# Patient Record
Sex: MALE | Race: WHITE | NOT HISPANIC OR LATINO | Employment: OTHER | URBAN - METROPOLITAN AREA
[De-identification: names, ages, dates, MRNs, and addresses within clinical notes are randomized per-mention and may not be internally consistent; named-entity substitution may affect disease eponyms.]

---

## 2017-10-15 ENCOUNTER — HOSPITAL ENCOUNTER (EMERGENCY)
Facility: HOSPITAL | Age: 82
Discharge: HOME | End: 2017-10-16
Attending: EMERGENCY MEDICINE
Payer: MEDICARE

## 2017-10-15 VITALS
OXYGEN SATURATION: 95 % | BODY MASS INDEX: 30.31 KG/M2 | HEART RATE: 100 BPM | SYSTOLIC BLOOD PRESSURE: 123 MMHG | HEIGHT: 68 IN | WEIGHT: 200 LBS | DIASTOLIC BLOOD PRESSURE: 70 MMHG | TEMPERATURE: 100 F | RESPIRATION RATE: 20 BRPM

## 2017-10-15 DIAGNOSIS — J10.1 INFLUENZA A: Primary | ICD-10-CM

## 2017-10-15 LAB
ANION GAP SERPL CALC-SCNC: 11 MMOL/L
BASOPHILS # BLD AUTO: ABNORMAL K/UL
BASOPHILS NFR BLD: 1 %
BUN SERPL-MCNC: 8 MG/DL
CALCIUM SERPL-MCNC: 9.4 MG/DL
CHLORIDE SERPL-SCNC: 105 MMOL/L
CO2 SERPL-SCNC: 23 MMOL/L
CREAT SERPL-MCNC: 0.9 MG/DL
DIFFERENTIAL METHOD: ABNORMAL
EOSINOPHIL # BLD AUTO: ABNORMAL K/UL
EOSINOPHIL NFR BLD: 0 %
ERYTHROCYTE [DISTWIDTH] IN BLOOD BY AUTOMATED COUNT: 13.6 %
EST. GFR  (AFRICAN AMERICAN): >60 ML/MIN/1.73 M^2
EST. GFR  (NON AFRICAN AMERICAN): >60 ML/MIN/1.73 M^2
FLUAV AG SPEC QL IA: POSITIVE
FLUBV AG SPEC QL IA: NEGATIVE
GLUCOSE SERPL-MCNC: 96 MG/DL
HCT VFR BLD AUTO: 45.2 %
HGB BLD-MCNC: 15.2 G/DL
LACTATE SERPL-SCNC: 2.5 MMOL/L
LYMPHOCYTES # BLD AUTO: ABNORMAL K/UL
LYMPHOCYTES NFR BLD: 8 %
MCH RBC QN AUTO: 28.7 PG
MCHC RBC AUTO-ENTMCNC: 33.6 G/DL
MCV RBC AUTO: 86 FL
MONOCYTES # BLD AUTO: ABNORMAL K/UL
MONOCYTES NFR BLD: 12 %
NEUTROPHILS NFR BLD: 77 %
NEUTS BAND NFR BLD MANUAL: 2 %
NRBC BLD-RTO: 0 /100 WBC
PLATELET # BLD AUTO: 203 K/UL
PLATELET BLD QL SMEAR: ABNORMAL
PMV BLD AUTO: 6.9 FL
POLYCHROMASIA BLD QL SMEAR: ABNORMAL
POTASSIUM SERPL-SCNC: 3.7 MMOL/L
RBC # BLD AUTO: 5.28 M/UL
SODIUM SERPL-SCNC: 139 MMOL/L
SPECIMEN SOURCE: ABNORMAL
WBC # BLD AUTO: 5.2 K/UL

## 2017-10-15 PROCEDURE — 83605 ASSAY OF LACTIC ACID: CPT

## 2017-10-15 PROCEDURE — 85027 COMPLETE CBC AUTOMATED: CPT

## 2017-10-15 PROCEDURE — 99900026 HC AIRWAY MAINTENANCE (STAT)

## 2017-10-15 PROCEDURE — 99284 EMERGENCY DEPT VISIT MOD MDM: CPT | Mod: 25

## 2017-10-15 PROCEDURE — 85007 BL SMEAR W/DIFF WBC COUNT: CPT

## 2017-10-15 PROCEDURE — 36415 COLL VENOUS BLD VENIPUNCTURE: CPT

## 2017-10-15 PROCEDURE — 25000003 PHARM REV CODE 250: Performed by: EMERGENCY MEDICINE

## 2017-10-15 PROCEDURE — 87400 INFLUENZA A/B EACH AG IA: CPT

## 2017-10-15 PROCEDURE — 80048 BASIC METABOLIC PNL TOTAL CA: CPT

## 2017-10-15 RX ORDER — OSELTAMIVIR PHOSPHATE 75 MG/1
75 CAPSULE ORAL 2 TIMES DAILY
Qty: 10 CAPSULE | Refills: 0 | Status: SHIPPED | OUTPATIENT
Start: 2017-10-15 | End: 2017-10-20

## 2017-10-15 RX ORDER — ACETAMINOPHEN 325 MG/1
650 TABLET ORAL
Status: COMPLETED | OUTPATIENT
Start: 2017-10-15 | End: 2017-10-15

## 2017-10-15 RX ADMIN — SODIUM CHLORIDE 1000 ML: 0.9 INJECTION, SOLUTION INTRAVENOUS at 10:10

## 2017-10-15 RX ADMIN — ACETAMINOPHEN 650 MG: 325 TABLET ORAL at 10:10

## 2017-10-16 NOTE — ED PROVIDER NOTES
Encounter Date: 10/15/2017    SCRIBE #1 NOTE: Adama MAYER, am scribing for, and in the presence of, Dr. Chris .       History     Chief Complaint   Patient presents with    Cough       10/15/2017 9:24 PM     Chief complaint: Cough      Kevyn Terrell Jr. is a 86 y.o. male with a hx of Alzheimer and Dementia who presents to the ED with complaints of cough associated with rhinorrhea and congestion since this morning. Relative notes a sick contact, pt's granddaughter has Pneumonia. Pt denies emesis and fever. Pt has NKDA.         The history is provided by the patient and a relative.     Review of patient's allergies indicates:  No Known Allergies  Past Medical History:   Diagnosis Date    Alzheimer disease      History reviewed. No pertinent surgical history.  History reviewed. No pertinent family history.  Social History   Substance Use Topics    Smoking status: Never Smoker    Smokeless tobacco: Never Used    Alcohol use Yes     Review of Systems   Constitutional: Negative for fever.   HENT: Positive for congestion and rhinorrhea. Negative for sore throat.    Respiratory: Positive for cough. Negative for shortness of breath.    Cardiovascular: Negative for chest pain.   Gastrointestinal: Negative for diarrhea, nausea and vomiting.   Genitourinary: Negative for dysuria.   Musculoskeletal: Negative for back pain.   Skin: Negative for rash.   Neurological: Negative for weakness.   Hematological: Does not bruise/bleed easily.       Physical Exam     Initial Vitals [10/15/17 2115]   BP Pulse Resp Temp SpO2   123/70 (!) 114 17 100.2 °F (37.9 °C) 96 %      MAP       87.67         Physical Exam    Nursing note and vitals reviewed.  Constitutional: He appears well-developed and well-nourished. He is not diaphoretic. No distress.   HENT:   Head: Normocephalic and atraumatic.   Mouth/Throat: Oropharynx is clear and moist.   Eyes: Conjunctivae are normal.   Neck: Neck supple.   Cardiovascular: Normal rate,  regular rhythm, normal heart sounds and intact distal pulses. Exam reveals no gallop and no friction rub.    No murmur heard.  Pulmonary/Chest: Breath sounds normal. He has no wheezes. He has no rhonchi. He has no rales.   Abdominal: Soft. He exhibits no distension. There is no tenderness.   Musculoskeletal: Normal range of motion.   Neurological: He is alert. No sensory deficit.   Follows commands well. Answers questions appropriately. Pt is conversing and alert.    Skin: No rash noted. No erythema.   Psychiatric: He has a normal mood and affect. His behavior is normal. Judgment and thought content normal.         ED Course   Procedures  Labs Reviewed   CBC W/ AUTO DIFFERENTIAL - Abnormal; Notable for the following:        Result Value    MPV 6.9 (*)     Gran% 77.0 (*)     Lymph% 8.0 (*)     All other components within normal limits   LACTIC ACID, PLASMA - Abnormal; Notable for the following:     Lactate (Lactic Acid) 2.5 (*)     All other components within normal limits   INFLUENZA A AND B ANTIGEN - Abnormal; Notable for the following:     Influenza A Ag, EIA Positive (*)     All other components within normal limits   BASIC METABOLIC PANEL                        Scribe Attestation:   Scribe #1: I performed the above scribed service and the documentation accurately describes the services I performed. I attest to the accuracy of the note.    I, Dr. Haroldo Chris, personally performed the services described in this documentation. All medical record entries made by the scribe were at my direction and in my presence.  I have reviewed the chart and agree that the record reflects my personal performance and is accurate and complete. Haroldo Chris MD.  11:28 PM 10/15/2017    Kevyn Terrell Jr. is a 86 y.o. male presenting with upper respiratory symptoms in the setting of multiple kids contacts in the family and positive influenza testing here.  I suspect influenza.  There is no increased work of breathing.   Initial workup for possible pneumonia shows no focal infiltrates suggestive of pneumonia.  I do not think antibiotics are indicated.  I doubt sepsis.  He does have mildly increased lactic acid likely secondary to mild dehydration with current illness.  Patient was subsequently given 1 L normal saline section IV fluids while oral rehydration reviewed for home.  He is taking fluids by mouth well.  I do not think requires admission for further IV fluids or observation.  I do not think further blood cultures are indicated.  I have spoken extensively with family regarding return precautions and close PCP follow-up.  I did initiate oseltamivir for 5 day course given patient's age.  Detailed return precautions reviewed.        ED Course as of Oct 15 2329   Sun Oct 15, 2017   2214 CXR:  Large hiatal hernia.  No evidence of infiltrate.  (rad read)    The patient was informed of the incidental finding(s) as well as the need for PCP or specialist follow-up for reevaluation and possible further investigation or monitoring.    [MR]      ED Course User Index  [MR] Haroldo Chris MD     Clinical Impression:     1. Influenza A                               Haroldo Chris MD  10/15/17 8281

## 2017-10-16 NOTE — PLAN OF CARE
10/15/17 6816   Patient Assessment/Suction   $ Swab or suction? Left nare;Right nare;Swab;Flu   Sent to the lab? Yes

## 2019-09-25 ENCOUNTER — TELEPHONE (OUTPATIENT)
Dept: FAMILY MEDICINE | Facility: CLINIC | Age: 84
End: 2019-09-25

## 2019-09-25 NOTE — TELEPHONE ENCOUNTER
----- Message from Derrick Macedo LPN sent at 9/25/2019  1:51 PM CDT -----  Contact: Maricruz patient's granddaughter      ----- Message -----  From: Robert Kyle  Sent: 9/25/2019  11:51 AM CDT  To: Carole Rangel Staff    Patient has just been diagnosed with cancer and granddaughter needs dr. Rangel to call her back so she can know where the patient can go to get treated. He has prostate and bone cancer. [facility] but because [facility] don't accept his insurance they didn't give him any medication or anything  Maricruz's# [phone]

## 2019-09-25 NOTE — TELEPHONE ENCOUNTER
Spoke with pt  Granddaughter- Pt allison states that her grandfather has prostate cancer that has spread to his bones, spine and rib cage. Pt  Was seen at [facility] and informed him that he needed to find a dct in pt network for care because they couldn't even write him a rx . Shane bryant given the names of several urologist and the names of dct at the [facility] center, Shane bryant informed to call those dct to see if they take [employer] and to call [employer] and get the names of urologist that pt can see. Shane bryant states that she will but wanted to know if Dr. Rangel can prescribe something for pt to take now for pain until he gets an appt.

## 2019-09-25 NOTE — TELEPHONE ENCOUNTER
Don't have a problem treating the patient, but at this time I have no records of his diagnosis.  At this time, he would need an appointment with me for at least an examination as it has been 3 months and in light of his dementia. I also need his records requested, which she will likely have to sign for

## 2019-09-25 NOTE — TELEPHONE ENCOUNTER
Call pt and get more info about what exactly the granddaughter has had the pt do or needs (pt has dementia).  Message states [facility] to [facility] due to [facility] issues.  Most people take medicare, so unclear.  He will have to see Urology for the prostate cancer issue and Oncology for the prostate/bonecancer issue. The oncologist usually gives med if needed or sometimes they do other procedures, like radiation. We have several urologists(Nita Donovan, Aniket, Jose De Jesus)  in the area or she can contact her insurance. We have the cancer center next door ([facility]ita [facility], [facility], or [facility])or she can contact her insurance.

## 2019-09-26 ENCOUNTER — TELEPHONE (OUTPATIENT)
Dept: UROLOGY | Facility: CLINIC | Age: 84
End: 2019-09-26

## 2019-09-26 NOTE — TELEPHONE ENCOUNTER
Patient is scheduled to see MD Monday.   Spoke with patient's daughter.  She says she does not know if patient has seen urologist before, but could be more than 10 years ago.  He has dementia and will not remember.  Asked about provider that would be monitoring his psa labs.  She says she does not know.  Her daughter (patient's grand daughter) is who takes care of him.    Maricruz [phone]  Tried to call Maricruz.  Left message for her to call back.  Left message that we need more information about his history.  Will need information about his recent visit to ED and the name of hospital.  Called Dr. Rangel's office to see if any PSA results on file, H&P.

## 2019-09-27 NOTE — TELEPHONE ENCOUNTER
Not sure I understand 1. How he knows he has metastatic prostate cancer if hasnt seen urology in 10 years.    As well, initial call noted    Pt allison states that her grandfather has prostate cancer that has spread to his bones, spine and rib cage. Pt  Was seen at [facility] and informed him that he needed to find a dct in pt network for care because they couldn't even write him a rx     Though no rx given, did he have imaging studies or clinic visit at [facility]?? Nothing in care everywhere.    Need to find out more before patient is seen, as not making sense. Did we make inquiry to [facility]?

## 2019-09-30 DIAGNOSIS — C61 PROSTATE CANCER: Primary | ICD-10-CM

## 2020-08-30 ENCOUNTER — HOSPITAL ENCOUNTER (INPATIENT)
Facility: HOSPITAL | Age: 85
LOS: 12 days | Discharge: NURSING, REHAB OR LONG-TERM CARE | DRG: 469 | End: 2020-09-11
Attending: EMERGENCY MEDICINE | Admitting: HOSPITALIST
Payer: MEDICARE

## 2020-08-30 DIAGNOSIS — M25.551 RIGHT HIP PAIN: ICD-10-CM

## 2020-08-30 DIAGNOSIS — G30.0 EARLY ONSET ALZHEIMER'S DEMENTIA WITHOUT BEHAVIORAL DISTURBANCE: ICD-10-CM

## 2020-08-30 DIAGNOSIS — R00.8 TRIGEMINY: ICD-10-CM

## 2020-08-30 DIAGNOSIS — F02.80 EARLY ONSET ALZHEIMER'S DEMENTIA WITHOUT BEHAVIORAL DISTURBANCE: ICD-10-CM

## 2020-08-30 DIAGNOSIS — S72.001A CLOSED RIGHT HIP FRACTURE, INITIAL ENCOUNTER: Primary | ICD-10-CM

## 2020-08-30 DIAGNOSIS — F02.818 LATE ONSET ALZHEIMER'S DISEASE WITH BEHAVIORAL DISTURBANCE: ICD-10-CM

## 2020-08-30 DIAGNOSIS — R07.9 CHEST PAIN: ICD-10-CM

## 2020-08-30 DIAGNOSIS — G30.1 LATE ONSET ALZHEIMER'S DISEASE WITH BEHAVIORAL DISTURBANCE: ICD-10-CM

## 2020-08-30 LAB
ALBUMIN SERPL BCP-MCNC: 2.9 G/DL (ref 3.5–5.2)
ALP SERPL-CCNC: 603 U/L (ref 55–135)
ALT SERPL W/O P-5'-P-CCNC: 13 U/L (ref 10–44)
ANION GAP SERPL CALC-SCNC: 19 MMOL/L (ref 8–16)
AST SERPL-CCNC: 22 U/L (ref 10–40)
BASOPHILS # BLD AUTO: 0.07 K/UL (ref 0–0.2)
BASOPHILS NFR BLD: 0.7 % (ref 0–1.9)
BILIRUB SERPL-MCNC: 0.5 MG/DL (ref 0.1–1)
BUN SERPL-MCNC: 10 MG/DL (ref 8–23)
CALCIUM SERPL-MCNC: 8.4 MG/DL (ref 8.7–10.5)
CHLORIDE SERPL-SCNC: 107 MMOL/L (ref 95–110)
CO2 SERPL-SCNC: 21 MMOL/L (ref 23–29)
CREAT SERPL-MCNC: 0.7 MG/DL (ref 0.5–1.4)
DIFFERENTIAL METHOD: ABNORMAL
EOSINOPHIL # BLD AUTO: 0.4 K/UL (ref 0–0.5)
EOSINOPHIL NFR BLD: 3.7 % (ref 0–8)
ERYTHROCYTE [DISTWIDTH] IN BLOOD BY AUTOMATED COUNT: 14.6 % (ref 11.5–14.5)
EST. GFR  (AFRICAN AMERICAN): >60 ML/MIN/1.73 M^2
EST. GFR  (NON AFRICAN AMERICAN): >60 ML/MIN/1.73 M^2
GLUCOSE SERPL-MCNC: 99 MG/DL (ref 70–110)
HCT VFR BLD AUTO: 38.9 % (ref 40–54)
HGB BLD-MCNC: 12.3 G/DL (ref 14–18)
IMM GRANULOCYTES # BLD AUTO: 0.09 K/UL (ref 0–0.04)
IMM GRANULOCYTES NFR BLD AUTO: 0.9 % (ref 0–0.5)
LYMPHOCYTES # BLD AUTO: 1.2 K/UL (ref 1–4.8)
LYMPHOCYTES NFR BLD: 11.9 % (ref 18–48)
MCH RBC QN AUTO: 27.8 PG (ref 27–31)
MCHC RBC AUTO-ENTMCNC: 31.6 G/DL (ref 32–36)
MCV RBC AUTO: 88 FL (ref 82–98)
MONOCYTES # BLD AUTO: 0.8 K/UL (ref 0.3–1)
MONOCYTES NFR BLD: 7.6 % (ref 4–15)
NEUTROPHILS # BLD AUTO: 7.8 K/UL (ref 1.8–7.7)
NEUTROPHILS NFR BLD: 75.2 % (ref 38–73)
NRBC BLD-RTO: 0 /100 WBC
PLATELET # BLD AUTO: 292 K/UL (ref 150–350)
PMV BLD AUTO: 9.1 FL (ref 9.2–12.9)
POTASSIUM SERPL-SCNC: 3 MMOL/L (ref 3.5–5.1)
PROT SERPL-MCNC: 6.3 G/DL (ref 6–8.4)
RBC # BLD AUTO: 4.42 M/UL (ref 4.6–6.2)
SARS-COV-2 RDRP RESP QL NAA+PROBE: NEGATIVE
SODIUM SERPL-SCNC: 147 MMOL/L (ref 136–145)
WBC # BLD AUTO: 10.33 K/UL (ref 3.9–12.7)

## 2020-08-30 PROCEDURE — 80053 COMPREHEN METABOLIC PANEL: CPT

## 2020-08-30 PROCEDURE — 93005 ELECTROCARDIOGRAM TRACING: CPT

## 2020-08-30 PROCEDURE — 63600175 PHARM REV CODE 636 W HCPCS: Performed by: EMERGENCY MEDICINE

## 2020-08-30 PROCEDURE — 12000002 HC ACUTE/MED SURGE SEMI-PRIVATE ROOM

## 2020-08-30 PROCEDURE — U0002 COVID-19 LAB TEST NON-CDC: HCPCS

## 2020-08-30 PROCEDURE — 96375 TX/PRO/DX INJ NEW DRUG ADDON: CPT

## 2020-08-30 PROCEDURE — 86850 RBC ANTIBODY SCREEN: CPT

## 2020-08-30 PROCEDURE — 99285 EMERGENCY DEPT VISIT HI MDM: CPT | Mod: 25

## 2020-08-30 PROCEDURE — 36415 COLL VENOUS BLD VENIPUNCTURE: CPT

## 2020-08-30 PROCEDURE — 85025 COMPLETE CBC W/AUTO DIFF WBC: CPT

## 2020-08-30 PROCEDURE — 96374 THER/PROPH/DIAG INJ IV PUSH: CPT

## 2020-08-30 RX ORDER — ONDANSETRON 2 MG/ML
4 INJECTION INTRAMUSCULAR; INTRAVENOUS
Status: COMPLETED | OUTPATIENT
Start: 2020-08-30 | End: 2020-08-30

## 2020-08-30 RX ORDER — MORPHINE SULFATE 2 MG/ML
6 INJECTION, SOLUTION INTRAMUSCULAR; INTRAVENOUS
Status: COMPLETED | OUTPATIENT
Start: 2020-08-30 | End: 2020-08-30

## 2020-08-30 RX ADMIN — MORPHINE SULFATE 6 MG: 2 INJECTION, SOLUTION INTRAMUSCULAR; INTRAVENOUS at 10:08

## 2020-08-30 RX ADMIN — ONDANSETRON 4 MG: 2 INJECTION INTRAMUSCULAR; INTRAVENOUS at 10:08

## 2020-08-31 ENCOUNTER — TELEPHONE (OUTPATIENT)
Dept: ORTHOPEDICS | Facility: CLINIC | Age: 85
End: 2020-08-31

## 2020-08-31 PROBLEM — E87.0 HYPERNATREMIA: Status: ACTIVE | Noted: 2020-08-31

## 2020-08-31 PROBLEM — E83.51 HYPOCALCEMIA: Status: ACTIVE | Noted: 2020-08-31

## 2020-08-31 PROBLEM — C79.51 PROSTATE CANCER METASTATIC TO BONE: Status: ACTIVE | Noted: 2020-08-31

## 2020-08-31 PROBLEM — G30.9 ALZHEIMER'S DEMENTIA WITHOUT BEHAVIORAL DISTURBANCE: Status: ACTIVE | Noted: 2020-08-31

## 2020-08-31 PROBLEM — F02.80 ALZHEIMER'S DEMENTIA WITHOUT BEHAVIORAL DISTURBANCE: Status: ACTIVE | Noted: 2020-08-31

## 2020-08-31 PROBLEM — E87.6 HYPOKALEMIA: Status: ACTIVE | Noted: 2020-08-31

## 2020-08-31 PROBLEM — C61 PROSTATE CANCER METASTATIC TO BONE: Status: ACTIVE | Noted: 2020-08-31

## 2020-08-31 PROBLEM — S72.001A CLOSED RIGHT HIP FRACTURE, INITIAL ENCOUNTER: Status: ACTIVE | Noted: 2020-08-31

## 2020-08-31 LAB
ABO + RH BLD: NORMAL
ANION GAP SERPL CALC-SCNC: 9 MMOL/L (ref 8–16)
BASOPHILS # BLD AUTO: 0.04 K/UL (ref 0–0.2)
BASOPHILS NFR BLD: 0.5 % (ref 0–1.9)
BLD GP AB SCN CELLS X3 SERPL QL: NORMAL
BUN SERPL-MCNC: 9 MG/DL (ref 8–23)
CALCIUM SERPL-MCNC: 7.8 MG/DL (ref 8.7–10.5)
CHLORIDE SERPL-SCNC: 109 MMOL/L (ref 95–110)
CO2 SERPL-SCNC: 27 MMOL/L (ref 23–29)
CREAT SERPL-MCNC: 0.6 MG/DL (ref 0.5–1.4)
DIFFERENTIAL METHOD: ABNORMAL
EOSINOPHIL # BLD AUTO: 0 K/UL (ref 0–0.5)
EOSINOPHIL NFR BLD: 0 % (ref 0–8)
ERYTHROCYTE [DISTWIDTH] IN BLOOD BY AUTOMATED COUNT: 15 % (ref 11.5–14.5)
EST. GFR  (AFRICAN AMERICAN): >60 ML/MIN/1.73 M^2
EST. GFR  (NON AFRICAN AMERICAN): >60 ML/MIN/1.73 M^2
GLUCOSE SERPL-MCNC: 106 MG/DL (ref 70–110)
HCT VFR BLD AUTO: 34.8 % (ref 40–54)
HGB BLD-MCNC: 10.6 G/DL (ref 14–18)
IMM GRANULOCYTES # BLD AUTO: 0.04 K/UL (ref 0–0.04)
IMM GRANULOCYTES NFR BLD AUTO: 0.5 % (ref 0–0.5)
INR PPP: 1.1 (ref 0.8–1.2)
LYMPHOCYTES # BLD AUTO: 1.3 K/UL (ref 1–4.8)
LYMPHOCYTES NFR BLD: 16.9 % (ref 18–48)
MAGNESIUM SERPL-MCNC: 1.9 MG/DL (ref 1.6–2.6)
MCH RBC QN AUTO: 27.7 PG (ref 27–31)
MCHC RBC AUTO-ENTMCNC: 30.5 G/DL (ref 32–36)
MCV RBC AUTO: 91 FL (ref 82–98)
MONOCYTES # BLD AUTO: 0.6 K/UL (ref 0.3–1)
MONOCYTES NFR BLD: 7.8 % (ref 4–15)
NEUTROPHILS # BLD AUTO: 5.6 K/UL (ref 1.8–7.7)
NEUTROPHILS NFR BLD: 74.3 % (ref 38–73)
NRBC BLD-RTO: 0 /100 WBC
PHOSPHATE SERPL-MCNC: 2.1 MG/DL (ref 2.7–4.5)
PLATELET # BLD AUTO: 245 K/UL (ref 150–350)
PMV BLD AUTO: 9.7 FL (ref 9.2–12.9)
POTASSIUM SERPL-SCNC: 3.9 MMOL/L (ref 3.5–5.1)
PROTHROMBIN TIME: 12.4 SEC (ref 9–12.5)
RBC # BLD AUTO: 3.82 M/UL (ref 4.6–6.2)
SODIUM SERPL-SCNC: 145 MMOL/L (ref 136–145)
WBC # BLD AUTO: 7.59 K/UL (ref 3.9–12.7)

## 2020-08-31 PROCEDURE — 85025 COMPLETE CBC W/AUTO DIFF WBC: CPT

## 2020-08-31 PROCEDURE — 93005 ELECTROCARDIOGRAM TRACING: CPT

## 2020-08-31 PROCEDURE — 25000003 PHARM REV CODE 250: Performed by: NURSE PRACTITIONER

## 2020-08-31 PROCEDURE — 25000003 PHARM REV CODE 250: Performed by: HOSPITALIST

## 2020-08-31 PROCEDURE — 36415 COLL VENOUS BLD VENIPUNCTURE: CPT

## 2020-08-31 PROCEDURE — 84100 ASSAY OF PHOSPHORUS: CPT

## 2020-08-31 PROCEDURE — 85610 PROTHROMBIN TIME: CPT

## 2020-08-31 PROCEDURE — 83735 ASSAY OF MAGNESIUM: CPT

## 2020-08-31 PROCEDURE — 80048 BASIC METABOLIC PNL TOTAL CA: CPT

## 2020-08-31 PROCEDURE — 12000002 HC ACUTE/MED SURGE SEMI-PRIVATE ROOM

## 2020-08-31 PROCEDURE — 63600175 PHARM REV CODE 636 W HCPCS: Performed by: HOSPITALIST

## 2020-08-31 RX ORDER — POTASSIUM CHLORIDE 20 MEQ/1
40 TABLET, EXTENDED RELEASE ORAL ONCE
Status: COMPLETED | OUTPATIENT
Start: 2020-08-31 | End: 2020-08-31

## 2020-08-31 RX ORDER — SODIUM,POTASSIUM PHOSPHATES 280-250MG
2 POWDER IN PACKET (EA) ORAL
Status: DISCONTINUED | OUTPATIENT
Start: 2020-08-31 | End: 2020-09-11 | Stop reason: HOSPADM

## 2020-08-31 RX ORDER — CALCIUM GLUCONATE 20 MG/ML
1 INJECTION, SOLUTION INTRAVENOUS ONCE
Status: COMPLETED | OUTPATIENT
Start: 2020-08-31 | End: 2020-08-31

## 2020-08-31 RX ORDER — SODIUM CHLORIDE 9 MG/ML
INJECTION, SOLUTION INTRAVENOUS CONTINUOUS
Status: DISCONTINUED | OUTPATIENT
Start: 2020-08-31 | End: 2020-09-03

## 2020-08-31 RX ORDER — IBUPROFEN 200 MG
24 TABLET ORAL
Status: DISCONTINUED | OUTPATIENT
Start: 2020-08-31 | End: 2020-09-11 | Stop reason: HOSPADM

## 2020-08-31 RX ORDER — GLUCAGON 1 MG
1 KIT INJECTION
Status: DISCONTINUED | OUTPATIENT
Start: 2020-08-31 | End: 2020-09-11 | Stop reason: HOSPADM

## 2020-08-31 RX ORDER — ACETAMINOPHEN 325 MG/1
650 TABLET ORAL EVERY 4 HOURS PRN
Status: DISCONTINUED | OUTPATIENT
Start: 2020-08-31 | End: 2020-09-11 | Stop reason: HOSPADM

## 2020-08-31 RX ORDER — LANOLIN ALCOHOL/MO/W.PET/CERES
800 CREAM (GRAM) TOPICAL
Status: DISCONTINUED | OUTPATIENT
Start: 2020-08-31 | End: 2020-09-11 | Stop reason: HOSPADM

## 2020-08-31 RX ORDER — MORPHINE SULFATE 4 MG/ML
4 INJECTION, SOLUTION INTRAMUSCULAR; INTRAVENOUS EVERY 4 HOURS PRN
Status: DISCONTINUED | OUTPATIENT
Start: 2020-08-31 | End: 2020-09-11 | Stop reason: HOSPADM

## 2020-08-31 RX ORDER — POTASSIUM CHLORIDE 1.5 G/1.58G
40 POWDER, FOR SOLUTION ORAL
Status: DISCONTINUED | OUTPATIENT
Start: 2020-08-31 | End: 2020-09-11 | Stop reason: HOSPADM

## 2020-08-31 RX ORDER — TALC
6 POWDER (GRAM) TOPICAL NIGHTLY PRN
Status: DISCONTINUED | OUTPATIENT
Start: 2020-08-31 | End: 2020-09-11 | Stop reason: HOSPADM

## 2020-08-31 RX ORDER — POTASSIUM CHLORIDE 1.5 G/1.58G
60 POWDER, FOR SOLUTION ORAL
Status: DISCONTINUED | OUTPATIENT
Start: 2020-08-31 | End: 2020-09-11 | Stop reason: HOSPADM

## 2020-08-31 RX ORDER — ONDANSETRON 2 MG/ML
4 INJECTION INTRAMUSCULAR; INTRAVENOUS EVERY 6 HOURS PRN
Status: DISCONTINUED | OUTPATIENT
Start: 2020-08-31 | End: 2020-09-11 | Stop reason: HOSPADM

## 2020-08-31 RX ORDER — SODIUM CHLORIDE 0.9 % (FLUSH) 0.9 %
10 SYRINGE (ML) INJECTION
Status: DISCONTINUED | OUTPATIENT
Start: 2020-08-31 | End: 2020-09-11 | Stop reason: HOSPADM

## 2020-08-31 RX ORDER — MORPHINE SULFATE 2 MG/ML
6 INJECTION, SOLUTION INTRAMUSCULAR; INTRAVENOUS EVERY 4 HOURS PRN
Status: DISCONTINUED | OUTPATIENT
Start: 2020-08-31 | End: 2020-09-11 | Stop reason: HOSPADM

## 2020-08-31 RX ORDER — IBUPROFEN 200 MG
16 TABLET ORAL
Status: DISCONTINUED | OUTPATIENT
Start: 2020-08-31 | End: 2020-09-11 | Stop reason: HOSPADM

## 2020-08-31 RX ADMIN — SODIUM CHLORIDE: 0.9 INJECTION, SOLUTION INTRAVENOUS at 10:08

## 2020-08-31 RX ADMIN — SODIUM PHOSPHATE, MONOBASIC, MONOHYDRATE 20.01 MMOL: 276; 142 INJECTION, SOLUTION INTRAVENOUS at 08:08

## 2020-08-31 RX ADMIN — POTASSIUM CHLORIDE 40 MEQ: 1500 TABLET, FILM COATED, EXTENDED RELEASE ORAL at 01:08

## 2020-08-31 RX ADMIN — MORPHINE SULFATE 4 MG: 4 INJECTION INTRAVENOUS at 02:08

## 2020-08-31 RX ADMIN — SODIUM CHLORIDE: 0.9 INJECTION, SOLUTION INTRAVENOUS at 02:08

## 2020-08-31 RX ADMIN — CALCIUM GLUCONATE 1000 MG: 20 INJECTION, SOLUTION INTRAVENOUS at 08:08

## 2020-08-31 NOTE — CONSULTS
Skin tear to left arm with wound care orders in place. Nurse to notify wound care nurse if worsening of skin tear.

## 2020-08-31 NOTE — NURSING
Patient arrived to floor at this time via bed . Nurse at side. Vital signs stable. Oriented to room. Personal belongins at bedside. IV access patent.  Pt verbalized understanding for reason of admission and plan of care. Assumed care at this time.

## 2020-08-31 NOTE — ASSESSMENT & PLAN NOTE
As noted on xray right hip:  1. Acute angulated and displaced subcapital fracture of the proximal right femur.  2. Findings concerning for sclerotic metastatic disease involving the visualized findings sacrum possibly the iliac wings.  Consult orthopedics  PT/OT consult   for dc planning.  Pain control.  Currently requiring IV narcotics for pain control.  NPO   bedrest

## 2020-08-31 NOTE — ED NOTES
Assumed care:  Kevyn Terrell Jr. is awake, alert and oriented x 3, skin warm and dry, in NAD.  Patient states that he fell down his stairs to his trailor about 3 hours PTA.  Patient CO right hip pain.      Patient identifiers for Kevyn Terrell Jr. checked and correct.  LOC:  Kevyn Terrell Jr. is awake, alert, and aware of environment with an appropriate affect. He is oriented x 3 and speaking appropriately.  APPEARANCE:  He is resting comfortably and in no acute distress. He is clean and well groomed, patient's clothing is properly fastened.  SKIN:  The skin is warm and dry. He has normal skin turgor and moist mucus membranes. Skin is intact; no bruising or breakdown noted.  MUSCULOSKELETAL:  He is moving all extremities well, no obvious deformities noted. Pulses intact.  Right hip pain, right foot turned out  RESPIRATORY:  Airway is open and patent. Respirations are spontaneous and non-labored with normal effort and rate.  CARDIAC:  tachycardia. No peripheral edema noted. Capillary refill < 3 seconds.  ABDOMEN:  No distention noted.  Soft and non-tender upon palpation.  NEUROLOGICAL:  PERRL. Facial expression is symmetrical. Hand grasps are equal bilaterally. Normal sensation in all extremities when touched with finger.  Allergies reported:  Review of patient's allergies indicates:  No Known Allergies  OTHER NOTES:

## 2020-08-31 NOTE — SUBJECTIVE & OBJECTIVE
Past Medical History:   Diagnosis Date    Alzheimer disease     Cancer     prostate       History reviewed. No pertinent surgical history.    Review of patient's allergies indicates:  No Known Allergies    No current facility-administered medications on file prior to encounter.      No current outpatient medications on file prior to encounter.     Family History     None        Tobacco Use    Smoking status: Never Smoker    Smokeless tobacco: Never Used   Substance and Sexual Activity    Alcohol use: Yes    Drug use: No    Sexual activity: Never     Review of Systems   Unable to perform ROS: Dementia     Objective:     Vital Signs (Most Recent):  Temp: 98.2 °F (36.8 °C) (08/31/20 0142)  Pulse: 108 (08/31/20 0142)  Resp: 16 (08/31/20 0142)  BP: (!) 172/97 (08/31/20 0142)  SpO2: (!) 93 % (08/31/20 0142) Vital Signs (24h Range):  Temp:  [98.2 °F (36.8 °C)-98.8 °F (37.1 °C)] 98.2 °F (36.8 °C)  Pulse:  [] 108  Resp:  [16-22] 16  SpO2:  [93 %-100 %] 93 %  BP: (137-202)/(57-97) 172/97     Weight: 68 kg (150 lb)  Body mass index is 22.81 kg/m².    Physical Exam  Vitals signs and nursing note reviewed.   Constitutional:       General: He is not in acute distress.     Appearance: He is well-developed.   HENT:      Head: Normocephalic and atraumatic.      Mouth/Throat:      Mouth: Mucous membranes are dry.   Eyes:      Conjunctiva/sclera: Conjunctivae normal.      Pupils: Pupils are equal, round, and reactive to light.   Neck:      Musculoskeletal: Normal range of motion and neck supple.   Cardiovascular:      Rate and Rhythm: Normal rate and regular rhythm.      Heart sounds: Normal heart sounds.   Pulmonary:      Effort: Pulmonary effort is normal.      Breath sounds: Normal breath sounds.   Abdominal:      General: Bowel sounds are normal.      Palpations: Abdomen is soft.   Genitourinary:     Comments: deferred  Musculoskeletal:         General: Tenderness (right hip) and deformity (RLE externally rotated and  shortened) present.   Skin:     General: Skin is warm and dry.   Neurological:      Mental Status: He is alert.      GCS: GCS eye subscore is 4. GCS verbal subscore is 4. GCS motor subscore is 6.      Comments: Oriented to person.     Psychiatric:         Mood and Affect: Mood normal.         Behavior: Behavior normal.           CRANIAL NERVES     CN III, IV, VI   Pupils are equal, round, and reactive to light.       Significant Labs:   CBC:   Recent Labs   Lab 08/30/20  2315   WBC 10.33   HGB 12.3*   HCT 38.9*        CMP:   Recent Labs   Lab 08/30/20  2316   *   K 3.0*      CO2 21*   GLU 99   BUN 10   CREATININE 0.7   CALCIUM 8.4*   PROT 6.3   ALBUMIN 2.9*   BILITOT 0.5   ALKPHOS 603*   AST 22   ALT 13   ANIONGAP 19*   EGFRNONAA >60       Significant Imaging: xray right hip: 1. Acute angulated and displaced subcapital fracture of the proximal right femur.  2. Findings concerning for sclerotic metastatic disease involving the visualized findings sacrum possibly the iliac wings

## 2020-08-31 NOTE — ASSESSMENT & PLAN NOTE
Patient has hypokalemia which is currently uncontrolled. Last electrolytes reviewed-   Recent Labs   Lab 08/30/20  2316   K 3.0*   . Will replace potassium and monitor electrolytes closely. Continuous telemetry.

## 2020-08-31 NOTE — PLAN OF CARE
Pt alert. Oriented to person. Confused. Requires frequent reorientation. IVF infusing. NPO status maintained. Incontinent of urine. Skin tear with dressing to left forearm. Moves arms and left lower extremity well. C/O pain to RLE. Awaiting consult by surgery. Pulses palpable. Calcium and phosphorous replaced today. Pt has remained afebrile. Oxygen at 96% room air. Telemetry monitoring continued. Bed in lowest position. Side rails up x 3. Call light and needed items placed within patient reach. Pt has a bed near the nursing station. Safety maintained.

## 2020-08-31 NOTE — NURSING
Left forearm cleansed with sterile normal saline. Foam dressing applied to affected area and secured with soft non-adherent bandage wrap.

## 2020-08-31 NOTE — PLAN OF CARE
Cm unable to assess pt, pt confused at this time.  Cm called daughter Nichelle and spoke with spouse-Marko. Marko communicated with his wife Nichelle mckeon cm asked the questions.  Pt lives with his granddaughters and their children. Pt was on [facility].  Marko verbalized pt will return to home with Hospice on discharge.   Disposition:  Daughter has given  verbal consent to sign the pt's choice disclosure form, to resume [facility].  Tj spoke with Ashley at [facility] - [phone].  She informed me that the family removed hospice so pt can get further care and will possibly resume Hospice on d/c.  Cm will continue to follow-up.       08/31/20 5732   Discharge Assessment   Assessment Type Discharge Planning Assessment   Confirmed/corrected address and phone number on facesheet? Yes   Assessment information obtained from? Caregiver   Expected Length of Stay (days) 3   Communicated expected length of stay with patient/caregiver yes   Prior to hospitilization cognitive status: Alert/Oriented   Prior to hospitalization functional status: Partially Dependent;Needs Assistance   Current cognitive status: Unable to Assess   Current Functional Status: Completely Dependent   Facility Arrived From: home   Lives With child(le), adult   Able to Return to Prior Arrangements yes   Is patient able to care for self after discharge? No   Who are your caregiver(s) and their phone number(s)? daughter- Nichelle- [phone]   Patient's perception of discharge disposition hospice/home   Readmission Within the Last 30 Days no previous admission in last 30 days   Patient currently being followed by outpatient case management? Yes   If yes, name of outpatient case management following: insurance company assigned oupatient case management   Patient currently receives any other outside agency services? Yes   Name and contact number of agency or person providing outside services [facility]   Is it the  patient/care giver preference to resume care with the current outside agency? Yes   Equipment Currently Used at Home wheelchair;hospital bed;oxygen;respiratory supplies   Do you have any problems affording any of your prescribed medications? No   Is the patient taking medications as prescribed? yes   Does the patient have transportation home? Yes   Transportation Anticipated family or friend will provide   Dialysis Name and Scheduled days no   Does the patient receive services at the [facility]mad[facility] Clinic? No   Discharge Plan A Hospice/home   Discharge Plan B Hospice/home   DME Needed Upon Discharge  none   Patient/Family in Agreement with Plan yes

## 2020-08-31 NOTE — ASSESSMENT & PLAN NOTE
Patient has hypokalemia which is currently controlled. Last electrolytes reviewed-   Recent Labs   Lab 08/30/20  2316 08/31/20  0513   K 3.0* 3.9   . Will replace potassium and monitor electrolytes closely. Continuous telemetry.

## 2020-08-31 NOTE — PLAN OF CARE
Advance Care Planning   Patient's granddaughter wishes to change patient to full code at this time. orders placed

## 2020-08-31 NOTE — PROGRESS NOTES
Morning lab results revealed a Phosphorous level of 2.1 and Calcium of 7.8. Pt is NPO. Dr. Huber notified. Awaiting orders.

## 2020-08-31 NOTE — PROGRESS NOTES
[facility] Hospital Medicine  Progress Note    Patient Name: Kevyn Terrell Jr.  MRN: [id number]  Patient Class: IP- Inpatient   Admission Date: 8/30/2020  Length of Stay: 0 days  Attending Physician: Maricruz Huber MD  Primary Care Provider: Carole Rangel MD        Subjective:     Principal Problem:Closed right hip fracture, initial encounter        HPI:  Kevyn Terrell Jr. is a 89 y.o. male with a PMHx of Alzheimer's disease and metastatic cancer who presented to the ED via EMS with right hip pain following a fall earlier today.  Pt's granddaughter states that she had gone to the grocery store and when she returned, she found pt on the ground.  He had apparently fallen down 2 stairs at his trailer.  Pt is pleasantly confused and does not remember the incident.  History is obtained from the granddaughter.  Pt was recently medicated for pain and has no complaints at this time.        Overview/Hospital Course:  No notes on file    Interval History:  Patient seen and examined.  Pleasantly confused with no complaints    Review of Systems   Unable to perform ROS: Dementia     Objective:     Vital Signs (Most Recent):  Temp: 96.8 °F (36 °C) (08/31/20 1301)  Pulse: 66 (08/31/20 1301)  Resp: 18 (08/31/20 1301)  BP: (!) 121/55 (08/31/20 1301)  SpO2: (!) 92 % (08/31/20 1301) Vital Signs (24h Range):  Temp:  [96.8 °F (36 °C)-98.8 °F (37.1 °C)] 96.8 °F (36 °C)  Pulse:  [] 66  Resp:  [16-22] 18  SpO2:  [92 %-100 %] 92 %  BP: (121-202)/(55-97) 121/55     Weight: 60 kg (132 lb 5.8 oz)  Body mass index is 20.13 kg/m².  No intake or output data in the 24 hours ending 08/31/20 1303   Physical Exam  Vitals signs and nursing note reviewed.   Constitutional:       General: He is not in acute distress.     Appearance: He is well-developed.   HENT:      Head: Normocephalic and atraumatic.      Mouth/Throat:      Mouth: Mucous membranes are moist.   Eyes:      Conjunctiva/sclera: Conjunctivae normal.       Pupils: Pupils are equal, round, and reactive to light.   Neck:      Musculoskeletal: Normal range of motion and neck supple.   Cardiovascular:      Rate and Rhythm: Normal rate and regular rhythm.      Heart sounds: Normal heart sounds.   Pulmonary:      Effort: Pulmonary effort is normal.      Breath sounds: Normal breath sounds.   Abdominal:      General: Bowel sounds are normal.      Palpations: Abdomen is soft.   Musculoskeletal:         General: Tenderness (right hip) and deformity (RLE externally rotated and shortened) present.      Comments: Bandage over superficial left upper extremity abrasions   Skin:     General: Skin is warm and dry.   Neurological:      Mental Status: He is alert. He is confused.      Comments: Oriented to person.     Psychiatric:         Mood and Affect: Mood normal.         Behavior: Behavior normal.         Significant Labs: All pertinent labs within the past 24 hours have been reviewed.    Significant Imaging: I have reviewed and interpreted all pertinent imaging results/findings within the past 24 hours.      Assessment/Plan:      * Closed right hip fracture, initial encounter  Right hip x-ray reviewed  Consult orthopedics  PT/OT consult after orthopedics eval   for dc planning.  Pain control.  Currently requiring IV narcotics for pain control.  NPO   bedrest      Alzheimer's dementia without behavioral disturbance  Chronic.  Fall and delirium precautions.  1) During the day, please open the shades and turn on the lights- If patient is in private room, please make sure they are close to the window.    2) Make sure the correct date and time is written on the whiteboard, as well as the current care team  3) Minimize interruptions at night-time, close shades and turn off TV.   4) When possible, during daytime make sure patient is in chair and provide activities that engage patient.  5) Please make sure patient has hearing aids and glasses while awake.        Prostate  cancer metastatic to bone  Followed by Hematology/Oncology at [facility].      Hypokalemia  Patient has hypokalemia which is currently controlled. Last electrolytes reviewed-   Recent Labs   Lab 08/30/20  2316 08/31/20  0527   K 3.0* 3.9   . Will replace potassium and monitor electrolytes closely. Continuous telemetry.      VTE Risk Mitigation (From admission, onward)         Ordered     IP VTE HIGH RISK PATIENT  Once      08/31/20 0212     Place sequential compression device  Until discontinued      08/31/20 0212                Discharge Planning   BREANNE: 9/4/2020     Code Status: DNR   Is the patient medically ready for discharge?:     Reason for patient still in hospital (select all that apply): Consult recommendations                     Maricruz Huber MD  Department of Hospital Medicine   [facility]

## 2020-08-31 NOTE — PT/OT/SLP PROGRESS
Physical Therapy      Patient Name:  Kevyn Terrell Jr.   MRN:  [id number]    PT orders acknowledged. Pt with R subcapital fx with ortho consult pending/on bedrest. Please re consult as appropriate.    Nereida Enriquez, PT

## 2020-08-31 NOTE — ED PROVIDER NOTES
Encounter Date: 8/30/2020    SCRIBE #1 NOTE: I, Zaida eLpe, am scribing for, and in the presence of, Dr. Ng.       History     Chief Complaint   Patient presents with    Fall     Down stairs, ~ 3 hours PTA, R pelvic/ hip pain       Time seen by provider: 10:48 PM on 08/30/2020    Kevyn Terrell Jr. is a 89 y.o. male with PMHx of Alzheimer who presents to the ED via EMS with an onset of right hip pain s/p a fall down 2 steps at his trailer.  Patient doesn't remember what happened but notes constant pain.  He notes that he lives with his daughter and grand daughter.  He confirms pain to his right hip.  The patient denies head pain, neck pain or any other symptoms at this time.  No PSHx.  History is limited secondary to Alzheimer's    The history is provided by the patient and a relative.     Review of patient's allergies indicates:  No Known Allergies  Past Medical History:   Diagnosis Date    Alzheimer disease      No past surgical history on file.  No family history on file.  Social History     Tobacco Use    Smoking status: Never Smoker    Smokeless tobacco: Never Used   Substance Use Topics    Alcohol use: Yes    Drug use: No     Review of Systems   Constitutional: Negative for fever.   HENT: Negative for sore throat.    Respiratory: Negative for shortness of breath.    Cardiovascular: Negative for chest pain.   Gastrointestinal: Positive for abdominal pain. Negative for nausea.   Genitourinary: Negative for dysuria.   Musculoskeletal: Positive for arthralgias. Negative for back pain and neck pain.   Skin: Negative for rash.   Neurological: Negative for weakness.   Hematological: Does not bruise/bleed easily.       Physical Exam     Initial Vitals [08/30/20 2202]   BP Pulse Resp Temp SpO2   (!) 177/74 (!) 122 (!) 21 98.8 °F (37.1 °C) 100 %      MAP       --         Physical Exam    Nursing note and vitals reviewed.  Constitutional: He appears well-developed and well-nourished. No distress.    Non-toxic, well-appearing male.  Patient is tremulous.    HENT:   Head: Normocephalic and atraumatic.   Eyes: Conjunctivae and EOM are normal. Pupils are equal, round, and reactive to light.   Neck: Neck supple.   Cardiovascular: Normal rate, regular rhythm and normal heart sounds. Exam reveals no gallop and no friction rub.    No murmur heard.  Pulses:       Carotid pulses are 2+ on the right side and 2+ on the left side.       Radial pulses are 2+ on the right side and 2+ on the left side.        Femoral pulses are 2+ on the right side and 2+ on the left side.       Popliteal pulses are 2+ on the right side and 2+ on the left side.        Dorsalis pedis pulses are 2+ on the right side and 2+ on the left side.        Posterior tibial pulses are 2+ on the right side and 2+ on the left side.   Pulmonary/Chest: Breath sounds normal. No respiratory distress. He has no wheezes. He has no rhonchi. He has no rales.   Abdominal: Soft. Bowel sounds are normal. He exhibits no distension. There is no abdominal tenderness.   Musculoskeletal: Normal range of motion. No edema.      Right hip: He exhibits tenderness.        Right ankle: No tenderness (distal end).      Comments: Patient is positive for tenderness over the right anterior hip with shortened externally rotated RLE.    Neurological: He is alert and oriented to person, place, and time.   Skin: Skin is warm and dry.   Psychiatric: He has a normal mood and affect.         ED Course   Procedures  Labs Reviewed   CBC W/ AUTO DIFFERENTIAL - Abnormal; Notable for the following components:       Result Value    RBC 4.42 (*)     Hemoglobin 12.3 (*)     Hematocrit 38.9 (*)     Mean Corpuscular Hemoglobin Conc 31.6 (*)     RDW 14.6 (*)     MPV 9.1 (*)     Immature Granulocytes 0.9 (*)     Gran # (ANC) 7.8 (*)     Immature Grans (Abs) 0.09 (*)     Gran% 75.2 (*)     Lymph% 11.9 (*)     All other components within normal limits   COMPREHENSIVE METABOLIC PANEL   SARS-COV-2 RNA  AMPLIFICATION, QUAL   TYPE & SCREEN     EKG Readings: (Independently Interpreted)   Initial Reading: No STEMI. Rhythm: Normal Sinus Rhythm. Heart Rate: 97 bpm. Conduction: RBBB. T Waves Flipped: V1, V2 and V3.   Normal sinus rhythm at a ventricular rate of 97 bpm with frequent PVC's, RBBB sign artifact.  No ST elevation. T wave inversion on V1, V2, V3 leads.        Imaging Results          X-Ray Hip 2 View Right (In process)                  Medical Decision Making:   History:   Old Medical Records: I decided to obtain old medical records.  Independently Interpreted Test(s):   I have ordered and independently interpreted EKG Reading(s) - see prior notes  Clinical Tests:   Lab Tests: Ordered and Reviewed  Radiological Study: Ordered and Reviewed  Medical Tests: Ordered and Reviewed            Scribe Attestation:   Scribe #1: I performed the above scribed service and the documentation accurately describes the services I performed. I attest to the accuracy of the note.    I, Dr. Agustín Ng personally performed the services described in this documentation. All medical record entries made by the scribe were at my direction and in my presence.  I have reviewed the chart and agree that the record reflects my personal performance and is accurate and complete. Agustín Ng MD.  6:47 AM 08/31/2020    DISCLAIMER: This note was prepared with Dragon NaturallySpeaking voice recognition transcription software. Garbled syntax, mangled pronouns, and other bizarre constructions may be attributed to that software system         ED Course as of Aug 30 2355   Sun Aug 30, 2020   2332 Concern for hip fracture.  Will likely admit.    [JS]   2342 Patient appears to have a femoral neck fracture.  He will need to be admitted with orthopedic consult.    [JS]   2343 He has a history of Alzheimer's.    [JS]      ED Course User Index  [JS] Agustín Ng MD                Clinical Impression:       ICD-10-CM ICD-9-CM   1. Closed right  hip fracture, initial encounter  S72.001A 820.8   2. Right hip pain  M25.551 719.45   3. Late onset Alzheimer's disease with behavioral disturbance  G30.1 331.0    F02.81 294.11     R07.9 786.50         Disposition:   Disposition: Admitted                        Agustín Ng MD  08/31/20 0647       Agustín Ng MD  08/31/20 0647

## 2020-08-31 NOTE — SIGNIFICANT EVENT
Patients' grand daughter here to witness consent for surgery. She states that her grandfather lives with her and she wants his status changed so that he is resuscitated if anything happens during surgery. She no longer wishes for him to be DNR. Dr. Huber notified.

## 2020-08-31 NOTE — H&P
[facility] Hospital Medicine  History & Physical    Patient Name: Kevyn Terrell Jr.  MRN: [id number]  Admission Date: 8/30/2020  Attending Physician: Maricruz Huber MD   Primary Care Provider: Carole Rangel MD         Patient information was obtained from relative(s), past medical records and ER records.     Subjective:     Principal Problem:Closed right hip fracture, initial encounter    Chief Complaint:   Chief Complaint   Patient presents with    Fall     Down stairs, ~ 3 hours PTA, R pelvic/ hip pain        HPI: Kevyn Terrell Jr. is a 89 y.o. male with a PMHx of Alzheimer's disease and metastatic cancer who presented to the ED via EMS with right hip pain following a fall earlier today.  Pt's granddaughter states that she had gone to the grocery store and when she returned, she found pt on the ground.  He had apparently fallen down 2 stairs at his trailer.  Pt is pleasantly confused and does not remember the incident.  History is obtained from the granddaughter.  Pt was recently medicated for pain and has no complaints at this time.        Past Medical History:   Diagnosis Date    Alzheimer disease     Cancer     prostate       History reviewed. No pertinent surgical history.    Review of patient's allergies indicates:  No Known Allergies    No current facility-administered medications on file prior to encounter.      No current outpatient medications on file prior to encounter.     Family History     None        Tobacco Use    Smoking status: Never Smoker    Smokeless tobacco: Never Used   Substance and Sexual Activity    Alcohol use: Yes    Drug use: No    Sexual activity: Never     Review of Systems   Unable to perform ROS: Dementia     Objective:     Vital Signs (Most Recent):  Temp: 98.2 °F (36.8 °C) (08/31/20 0142)  Pulse: 108 (08/31/20 0142)  Resp: 16 (08/31/20 0142)  BP: (!) 172/97 (08/31/20 0142)  SpO2: (!) 93 % (08/31/20 0142) Vital Signs (24h Range):  Temp:  [98.2 °F  (36.8 °C)-98.8 °F (37.1 °C)] 98.2 °F (36.8 °C)  Pulse:  [] 108  Resp:  [16-22] 16  SpO2:  [93 %-100 %] 93 %  BP: (137-202)/(57-97) 172/97     Weight: 68 kg (150 lb)  Body mass index is 22.81 kg/m².    Physical Exam  Vitals signs and nursing note reviewed.   Constitutional:       General: He is not in acute distress.     Appearance: He is well-developed.   HENT:      Head: Normocephalic and atraumatic.      Mouth/Throat:      Mouth: Mucous membranes are dry.   Eyes:      Conjunctiva/sclera: Conjunctivae normal.      Pupils: Pupils are equal, round, and reactive to light.   Neck:      Musculoskeletal: Normal range of motion and neck supple.   Cardiovascular:      Rate and Rhythm: Normal rate and regular rhythm.      Heart sounds: Normal heart sounds.   Pulmonary:      Effort: Pulmonary effort is normal.      Breath sounds: Normal breath sounds.   Abdominal:      General: Bowel sounds are normal.      Palpations: Abdomen is soft.   Genitourinary:     Comments: deferred  Musculoskeletal:         General: Tenderness (right hip) and deformity (RLE externally rotated and shortened) present.   Skin:     General: Skin is warm and dry.   Neurological:      Mental Status: He is alert.      GCS: GCS eye subscore is 4. GCS verbal subscore is 4. GCS motor subscore is 6.      Comments: Oriented to person.     Psychiatric:         Mood and Affect: Mood normal.         Behavior: Behavior normal.           CRANIAL NERVES     CN III, IV, VI   Pupils are equal, round, and reactive to light.       Significant Labs:   CBC:   Recent Labs   Lab 08/30/20  2315   WBC 10.33   HGB 12.3*   HCT 38.9*        CMP:   Recent Labs   Lab 08/30/20  2316   *   K 3.0*      CO2 21*   GLU 99   BUN 10   CREATININE 0.7   CALCIUM 8.4*   PROT 6.3   ALBUMIN 2.9*   BILITOT 0.5   ALKPHOS 603*   AST 22   ALT 13   ANIONGAP 19*   EGFRNONAA >60       Significant Imaging: xray right hip: 1. Acute angulated and displaced subcapital fracture of  the proximal right femur.  2. Findings concerning for sclerotic metastatic disease involving the visualized findings sacrum possibly the iliac wings    Assessment/Plan:     * Closed right hip fracture, initial encounter  As noted on xray right hip:  1. Acute angulated and displaced subcapital fracture of the proximal right femur.  2. Findings concerning for sclerotic metastatic disease involving the visualized findings sacrum possibly the iliac wings.  Consult orthopedics  PT/OT consult   for dc planning.  Pain control.  Currently requiring IV narcotics for pain control.  NPO   bedrest      Alzheimer's dementia without behavioral disturbance  Chronic.  Fall and delirium precautions.  1) During the day, please open the shades and turn on the lights- If patient is in private room, please make sure they are close to the window.    2) Make sure the correct date and time is written on the whiteboard, as well as the current care team  3) Minimize interruptions at night-time, close shades and turn off TV.   4) When possible, during daytime make sure patient is in chair and provide activities that engage patient.  5) Please make sure patient has hearing aids and glasses while awake.        Prostate cancer metastatic to bone  Followed by Hematology[facility]Oncology at [facility].      Hypokalemia  Patient has hypokalemia which is currently uncontrolled. Last electrolytes reviewed-   Recent Labs   Lab 08/30/20  2316   K 3.0*   . Will replace potassium and monitor electrolytes closely. Continuous telemetry.      VTE Risk Mitigation (From admission, onward)         Ordered     IP VTE HIGH RISK PATIENT  Once      08/31/20 0212     Place sequential compression device  Until discontinued      08/31/20 0212            Advance Care Planning   Per conversation with pt's granddaughter, pt has metastatic prostate cancer and pt/family do not wish to artificially prolong pt's life.  Pt's code status to be DNR.             Millie Key, GERMANP  Department of Hospital Medicine   [facility]

## 2020-08-31 NOTE — PLAN OF CARE
Patient AAO X 4. With intermittent confusion. Patient free from injury during shift. Pain managed pharmacologically. Provided full relief. Patient free from N/V during shift. IV access has IV fluids. Patient placed on cardiac monitoring. ST. Remained afebrile during shift. Pt on room air tolerating well. Condom cath in place. Restroom offered. Repositioned as needed. Safety maintained with bed alarm. Bed in lowest position, call light and personal items within reach. Patient verbalized understanding of care. Will continue to monitor with every 2 hour rounding.

## 2020-08-31 NOTE — SUBJECTIVE & OBJECTIVE
Interval History:  Patient seen and examined.  Pleasantly confused with no complaints    Review of Systems   Unable to perform ROS: Dementia     Objective:     Vital Signs (Most Recent):  Temp: 96.8 °F (36 °C) (08/31/20 1301)  Pulse: 66 (08/31/20 1301)  Resp: 18 (08/31/20 1301)  BP: (!) 121/55 (08/31/20 1301)  SpO2: (!) 92 % (08/31/20 1301) Vital Signs (24h Range):  Temp:  [96.8 °F (36 °C)-98.8 °F (37.1 °C)] 96.8 °F (36 °C)  Pulse:  [] 66  Resp:  [16-22] 18  SpO2:  [92 %-100 %] 92 %  BP: (121-202)/(55-97) 121/55     Weight: 60 kg (132 lb 5.8 oz)  Body mass index is 20.13 kg/m².  No intake or output data in the 24 hours ending 08/31/20 1303   Physical Exam  Vitals signs and nursing note reviewed.   Constitutional:       General: He is not in acute distress.     Appearance: He is well-developed.   HENT:      Head: Normocephalic and atraumatic.      Mouth/Throat:      Mouth: Mucous membranes are moist.   Eyes:      Conjunctiva/sclera: Conjunctivae normal.      Pupils: Pupils are equal, round, and reactive to light.   Neck:      Musculoskeletal: Normal range of motion and neck supple.   Cardiovascular:      Rate and Rhythm: Normal rate and regular rhythm.      Heart sounds: Normal heart sounds.   Pulmonary:      Effort: Pulmonary effort is normal.      Breath sounds: Normal breath sounds.   Abdominal:      General: Bowel sounds are normal.      Palpations: Abdomen is soft.   Musculoskeletal:         General: Tenderness (right hip) and deformity (RLE externally rotated and shortened) present.      Comments: Bandage over superficial left upper extremity abrasions   Skin:     General: Skin is warm and dry.   Neurological:      Mental Status: He is alert. He is confused.      Comments: Oriented to person.     Psychiatric:         Mood and Affect: Mood normal.         Behavior: Behavior normal.         Significant Labs: All pertinent labs within the past 24 hours have been reviewed.    Significant Imaging: I have  reviewed and interpreted all pertinent imaging results/findings within the past 24 hours.

## 2020-08-31 NOTE — TELEPHONE ENCOUNTER
----- Message from Nae Fernandez MA sent at 8/31/2020  8:30 AM CDT -----  Contact: Annie Felder  Room  423 Right hip FX   Attending  Sully GRAMAJO   Call back [phone]

## 2020-08-31 NOTE — ASSESSMENT & PLAN NOTE
Right hip x-ray reviewed  Consult orthopedics  PT/OT consult after orthopedics eval   for dc planning.  Pain control.  Currently requiring IV narcotics for pain control.  NPO   bedrest

## 2020-08-31 NOTE — HPI
Kevyn Terrell Jr. is a 89 y.o. male with a PMHx of Alzheimer's disease and metastatic cancer who presented to the ED via EMS with right hip pain following a fall earlier today.  Pt's granddaughter states that she had gone to the grocery store and when she returned, she found pt on the ground.  He had apparently fallen down 2 stairs at his trailer.  Pt is pleasantly confused and does not remember the incident.  History is obtained from the granddaughter.  Pt was recently medicated for pain and has no complaints at this time.

## 2020-08-31 NOTE — ASSESSMENT & PLAN NOTE
Chronic.  Fall and delirium precautions.  1) During the day, please open the shades and turn on the lights- If patient is in private room, please make sure they are close to the window.    2) Make sure the correct date and time is written on the whiteboard, as well as the current care team  3) Minimize interruptions at night-time, close shades and turn off TV.   4) When possible, during daytime make sure patient is in chair and provide activities that engage patient.  5) Please make sure patient has hearing aids and glasses while awake.

## 2020-08-31 NOTE — CONSULTS
Orthopaedic Surgery History and Physical     History of present illness:   Kevyn Terrell Jr. is a 89 y.o. male who presents with RIGHT hip pain s/p fall.      Allergies:   Review of patient's allergies indicates:  No Known Allergies    Past medical history:   Past Medical History:   Diagnosis Date    Alzheimer disease     Cancer     prostate       Past surgical history:  History reviewed. No pertinent surgical history.    Social history:   Reviewed per EPIC history for tobacco or alcohol use     Medications:    Current Facility-Administered Medications:     0.9%  NaCl infusion, , Intravenous, Continuous, Millie Key, MADY, Last Rate: 75 mL/hr at 08/31/20 0227    acetaminophen tablet 650 mg, 650 mg, Oral, Q4H PRN, Millie Key, GERMANP    dextrose 50% injection 12.5 g, 12.5 g, Intravenous, PRN, Millie Key, FNP    dextrose 50% injection 25 g, 25 g, Intravenous, PRN, Millie Key, FNP    glucagon (human recombinant) injection 1 mg, 1 mg, Intramuscular, PRN, Millie Key, GERMANP    glucose chewable tablet 16 g, 16 g, Oral, PRN, Millie eKy, GERMANP    glucose chewable tablet 24 g, 24 g, Oral, PRN, Millie Key, GERMANP    magnesium oxide tablet 800 mg, 800 mg, Oral, PRN, Millie Wilsoni, FNP    magnesium oxide tablet 800 mg, 800 mg, Oral, PRN, Millie Key, FNP    melatonin tablet 6 mg, 6 mg, Oral, Nightly PRN, Millie Key, GERMANP    morphine injection 4 mg, 4 mg, Intravenous, Q4H PRN, Maricruz Huber MD, 4 mg at 08/31/20 0226    morphine injection 6 mg, 6 mg, Intravenous, Q4H PRN, Maricruz Huber MD    ondansetron injection 4 mg, 4 mg, Intravenous, Q6H PRN, Millie Key, GERMANP    potassium chloride packet 40 mEq, 40 mEq, Oral, PRN, Millie Key, FNP    potassium chloride packet 40 mEq, 40 mEq, Oral, PRN, Millie Key, FNP    potassium chloride packet 60 mEq, 60 mEq, Oral, PRN, Millie Key, FNP     potassium, sodium phosphates 280-160-250 mg packet 2 packet, 2 packet, Oral, PRN, Millie Daveski, FNP    potassium, sodium phosphates 280-160-250 mg packet 2 packet, 2 packet, Oral, PRN, Millie Daveski, FNP    potassium, sodium phosphates 280-160-250 mg packet 2 packet, 2 packet, Oral, PRN, Millie Daveski, FNP    sodium chloride 0.9% flush 10 mL, 10 mL, Intravenous, PRN, Millie Key, FNP    Review of systems:  Unable to obtain secondary to dementia    Physical Exam:   Vitals:    08/31/20 0226 08/31/20 0755 08/31/20 1301 08/31/20 1602   BP:  133/63 (!) 121/55 (!) 117/58   BP Location:  Right arm     Patient Position:  Lying Sitting    Pulse:  66 66 74   Resp: 16 17 18 18   Temp:  97.5 °F (36.4 °C) 96.8 °F (36 °C) 98.3 °F (36.8 °C)   TempSrc:  Oral Oral Oral   SpO2:  96% (!) 92% 96%   Weight:       Height:         Recent Labs   Lab 08/30/20  2316 08/31/20  0527   CALCIUM 8.4* 7.8*   PROT 6.3  --    * 145   K 3.0* 3.9   CO2 21* 27    109   BUN 10 9   CREATININE 0.7 0.6     Recent Labs   Lab 08/31/20  0527   WBC 7.59   RBC 3.82*   HGB 10.6*   HCT 34.8*        Recent Labs   Lab 08/31/20  0527   INR 1.1       Awake/alert/oriented x1, No acute distress, Afebrile, Vital signs stable  Normocephalic, Atraumatic  Heart is beating at normal rate  Good inspiratory effort with unlaboured breathing  Abdomen soft/nondistended/nontender    right lower extremity  Resting position of the limb is shortened and externally rotated compared to contralateral  Motor grossly intact  Sensation grossly intact       Imaging:  Radiographs of the RIGHT hip demonstrate displaced femoral neck fracture    Assessment:   89 y.o. male with RIGHT hip fracture    Plan:   To OR for RIGHT hip rosy  Plan for Wednesday 9/2  Medical optimization per primary    Arthur Porter MD  [facility]

## 2020-09-01 ENCOUNTER — ANESTHESIA EVENT (OUTPATIENT)
Dept: SURGERY | Facility: HOSPITAL | Age: 85
DRG: 469 | End: 2020-09-01
Payer: MEDICARE

## 2020-09-01 PROBLEM — E83.39 HYPOPHOSPHATEMIA: Status: ACTIVE | Noted: 2020-09-01

## 2020-09-01 LAB
ANION GAP SERPL CALC-SCNC: 5 MMOL/L (ref 8–16)
BASOPHILS # BLD AUTO: 0.06 K/UL (ref 0–0.2)
BASOPHILS NFR BLD: 1 % (ref 0–1.9)
BUN SERPL-MCNC: 8 MG/DL (ref 8–23)
CALCIUM SERPL-MCNC: 7.5 MG/DL (ref 8.7–10.5)
CHLORIDE SERPL-SCNC: 112 MMOL/L (ref 95–110)
CO2 SERPL-SCNC: 26 MMOL/L (ref 23–29)
CREAT SERPL-MCNC: 0.6 MG/DL (ref 0.5–1.4)
DIFFERENTIAL METHOD: ABNORMAL
EOSINOPHIL # BLD AUTO: 0.2 K/UL (ref 0–0.5)
EOSINOPHIL NFR BLD: 3.2 % (ref 0–8)
ERYTHROCYTE [DISTWIDTH] IN BLOOD BY AUTOMATED COUNT: 15.1 % (ref 11.5–14.5)
EST. GFR  (AFRICAN AMERICAN): >60 ML/MIN/1.73 M^2
EST. GFR  (NON AFRICAN AMERICAN): >60 ML/MIN/1.73 M^2
GLUCOSE SERPL-MCNC: 81 MG/DL (ref 70–110)
HCT VFR BLD AUTO: 29.4 % (ref 40–54)
HGB BLD-MCNC: 8.9 G/DL (ref 14–18)
IMM GRANULOCYTES # BLD AUTO: 0.02 K/UL (ref 0–0.04)
IMM GRANULOCYTES NFR BLD AUTO: 0.3 % (ref 0–0.5)
LYMPHOCYTES # BLD AUTO: 1.7 K/UL (ref 1–4.8)
LYMPHOCYTES NFR BLD: 26.8 % (ref 18–48)
MAGNESIUM SERPL-MCNC: 1.9 MG/DL (ref 1.6–2.6)
MCH RBC QN AUTO: 26.9 PG (ref 27–31)
MCHC RBC AUTO-ENTMCNC: 30.3 G/DL (ref 32–36)
MCV RBC AUTO: 89 FL (ref 82–98)
MONOCYTES # BLD AUTO: 0.7 K/UL (ref 0.3–1)
MONOCYTES NFR BLD: 11.7 % (ref 4–15)
NEUTROPHILS # BLD AUTO: 3.6 K/UL (ref 1.8–7.7)
NEUTROPHILS NFR BLD: 57 % (ref 38–73)
NRBC BLD-RTO: 0 /100 WBC
PHOSPHATE SERPL-MCNC: 2.1 MG/DL (ref 2.7–4.5)
PLATELET # BLD AUTO: 206 K/UL (ref 150–350)
PMV BLD AUTO: 9.2 FL (ref 9.2–12.9)
POTASSIUM SERPL-SCNC: 3.8 MMOL/L (ref 3.5–5.1)
RBC # BLD AUTO: 3.31 M/UL (ref 4.6–6.2)
SODIUM SERPL-SCNC: 143 MMOL/L (ref 136–145)
WBC # BLD AUTO: 6.3 K/UL (ref 3.9–12.7)

## 2020-09-01 PROCEDURE — 85025 COMPLETE CBC W/AUTO DIFF WBC: CPT

## 2020-09-01 PROCEDURE — 83735 ASSAY OF MAGNESIUM: CPT

## 2020-09-01 PROCEDURE — 25000003 PHARM REV CODE 250: Performed by: HOSPITALIST

## 2020-09-01 PROCEDURE — 25000003 PHARM REV CODE 250: Performed by: NURSE PRACTITIONER

## 2020-09-01 PROCEDURE — 84100 ASSAY OF PHOSPHORUS: CPT

## 2020-09-01 PROCEDURE — 63600175 PHARM REV CODE 636 W HCPCS: Performed by: HOSPITALIST

## 2020-09-01 PROCEDURE — 36415 COLL VENOUS BLD VENIPUNCTURE: CPT

## 2020-09-01 PROCEDURE — 80048 BASIC METABOLIC PNL TOTAL CA: CPT

## 2020-09-01 PROCEDURE — 12000002 HC ACUTE/MED SURGE SEMI-PRIVATE ROOM

## 2020-09-01 RX ORDER — CALCIUM GLUCONATE 20 MG/ML
1 INJECTION, SOLUTION INTRAVENOUS ONCE
Status: COMPLETED | OUTPATIENT
Start: 2020-09-01 | End: 2020-09-01

## 2020-09-01 RX ADMIN — ACETAMINOPHEN 650 MG: 325 TABLET ORAL at 11:09

## 2020-09-01 RX ADMIN — ACETAMINOPHEN 650 MG: 325 TABLET ORAL at 06:09

## 2020-09-01 RX ADMIN — MORPHINE SULFATE 4 MG: 4 INJECTION INTRAVENOUS at 12:09

## 2020-09-01 RX ADMIN — CALCIUM GLUCONATE 1000 MG: 20 INJECTION, SOLUTION INTRAVENOUS at 12:09

## 2020-09-01 NOTE — PLAN OF CARE
Pt alert and oriented to self only. IVF infusing. Calcium and phosphorous replaced today. C/O pain to right hip with movement. Pt incontinent of urine. Able to use trapeze to assist with movement. Requires frequent reorientation. Dressing to left forearm changed. Small abrasion noted to left knee without drainage. Bed in lowest position. Side rails up x 3. Call light and needed items placed within patient reach. [employer] telesitter in use. Safety maintained.

## 2020-09-01 NOTE — PLAN OF CARE
POC discussed with patient, verbalized understanding. Patient with uneventful night, slept off and on throughout the night between care. VS stable. SR with PVC,Trigeminy ,Bigeminy on telemetry. Oriented X 1, otherwise pleasantly confused. . NV wnl. Receiving IV fluids. RA , 95%.  Safety maintained with hourly rounds. Incontinent of urine, brief changed X 2, Clear yellow. No compliant voiced, call light at bedside.

## 2020-09-01 NOTE — PROGRESS NOTES
[facility] Hospital Medicine  Progress Note    Patient Name: Kevyn Terrell Jr.  MRN: [id number]  Patient Class: IP- Inpatient   Admission Date: 8/30/2020  Length of Stay: 1 days  Attending Physician: Maricruz Huber MD  Primary Care Provider: Carole Rangel MD        Subjective:     Principal Problem:Closed right hip fracture, initial encounter        HPI:  Kevyn Terrell Jr. is a 89 y.o. male with a PMHx of Alzheimer's disease and metastatic cancer who presented to the ED via EMS with right hip pain following a fall earlier today.  Pt's granddaughter states that she had gone to the grocery store and when she returned, she found pt on the ground.  He had apparently fallen down 2 stairs at his trailer.  Pt is pleasantly confused and does not remember the incident.  History is obtained from the granddaughter.  Pt was recently medicated for pain and has no complaints at this time.        Overview/Hospital Course:  No notes on file    Interval History:  Patient seen and examined.  Remains pleasantly confused.  Surgery planned for tomorrow    Review of Systems   Unable to perform ROS: Dementia     Objective:     Vital Signs (Most Recent):  Temp: 98.3 °F (36.8 °C) (09/01/20 1120)  Pulse: 72 (09/01/20 1120)  Resp: 18 (09/01/20 1218)  BP: (!) 169/71 (09/01/20 1120)  SpO2: 96 % (09/01/20 1120) Vital Signs (24h Range):  Temp:  [97.8 °F (36.6 °C)-99.2 °F (37.3 °C)] 98.3 °F (36.8 °C)  Pulse:  [67-77] 72  Resp:  [18-20] 18  SpO2:  [93 %-96 %] 96 %  BP: (117-169)/(56-72) 169/71     Weight: 60 kg (132 lb 5.8 oz)  Body mass index is 20.13 kg/m².    Intake/Output Summary (Last 24 hours) at 9/1/2020 1524  Last data filed at 9/1/2020 1000  Gross per 24 hour   Intake 1370 ml   Output --   Net 1370 ml      Physical Exam  Vitals signs and nursing note reviewed.   Constitutional:       General: He is not in acute distress.     Appearance: He is well-developed.   HENT:      Head: Normocephalic and atraumatic.       Mouth/Throat:      Mouth: Mucous membranes are moist.   Eyes:      Conjunctiva/sclera: Conjunctivae normal.      Pupils: Pupils are equal, round, and reactive to light.   Neck:      Musculoskeletal: Normal range of motion and neck supple.   Cardiovascular:      Rate and Rhythm: Normal rate and regular rhythm.      Heart sounds: Normal heart sounds.   Pulmonary:      Effort: Pulmonary effort is normal.      Breath sounds: Normal breath sounds.   Abdominal:      General: Bowel sounds are normal.      Palpations: Abdomen is soft.   Musculoskeletal:         General: Tenderness (right hip) and deformity (RLE externally rotated and shortened) present.      Comments: Bandage over superficial left upper extremity abrasions   Skin:     General: Skin is warm and dry.   Neurological:      Mental Status: He is alert. He is confused.      Comments: Oriented to person.     Psychiatric:         Mood and Affect: Mood normal.         Behavior: Behavior normal.         Significant Labs: All pertinent labs within the past 24 hours have been reviewed.    Significant Imaging: I have reviewed and interpreted all pertinent imaging results/findings within the past 24 hours.      Assessment/Plan:      * Closed right hip fracture, initial encounter  Right hip x-ray reviewed  Orthopedics consulted, plan for surgery 9/2  PT/OT consult after surgery   for dc planning.  Pain control  NPO at midnight  bedrest      Hypophosphatemia  On replacement protocol      Alzheimer's dementia without behavioral disturbance  Chronic.  Fall and delirium precautions.  1) During the day, please open the shades and turn on the lights- If patient is in private room, please make sure they are close to the window.    2) Make sure the correct date and time is written on the whiteboard, as well as the current care team  3) Minimize interruptions at night-time, close shades and turn off TV.   4) When possible, during daytime make sure patient is in chair  and provide activities that engage patient.  5) Please make sure patient has hearing aids and glasses while awake.        Prostate cancer metastatic to bone  Followed by Hematology/Oncology at [facility].      Hypokalemia  Patient has hypokalemia which is currently controlled. Last electrolytes reviewed-   Recent Labs   Lab 08/30/20  2316 08/31/20  0527 09/01/20  0530   K 3.0* 3.9 3.8   . Will replace potassium and monitor electrolytes closely. Continuous telemetry.    Hypocalcemia  Replacement ordered.        VTE Risk Mitigation (From admission, onward)         Ordered     IP VTE HIGH RISK PATIENT  Once      08/31/20 0212     Place sequential compression device  Until discontinued      08/31/20 0212                Discharge Planning   BREANNE: 9/4/2020     Code Status: Full Code   Is the patient medically ready for discharge?:     Reason for patient still in hospital (select all that apply): Patient trending condition  Discharge Plan A: Hospice/home                  Maricruz Huber MD  Department of Hospital Medicine   [facility]

## 2020-09-01 NOTE — ASSESSMENT & PLAN NOTE
Right hip x-ray reviewed  Orthopedics consulted, plan for surgery 9/2  PT/OT consult after surgery   for dc planning.  Pain control  NPO at midnight  bedrest

## 2020-09-01 NOTE — SUBJECTIVE & OBJECTIVE
Interval History:  Patient seen and examined.  Remains pleasantly confused.  Surgery planned for tomorrow    Review of Systems   Unable to perform ROS: Dementia     Objective:     Vital Signs (Most Recent):  Temp: 98.3 °F (36.8 °C) (09/01/20 1120)  Pulse: 72 (09/01/20 1120)  Resp: 18 (09/01/20 1218)  BP: (!) 169/71 (09/01/20 1120)  SpO2: 96 % (09/01/20 1120) Vital Signs (24h Range):  Temp:  [97.8 °F (36.6 °C)-99.2 °F (37.3 °C)] 98.3 °F (36.8 °C)  Pulse:  [67-77] 72  Resp:  [18-20] 18  SpO2:  [93 %-96 %] 96 %  BP: (117-169)/(56-72) 169/71     Weight: 60 kg (132 lb 5.8 oz)  Body mass index is 20.13 kg/m².    Intake/Output Summary (Last 24 hours) at 9/1/2020 1524  Last data filed at 9/1/2020 1000  Gross per 24 hour   Intake 1370 ml   Output --   Net 1370 ml      Physical Exam  Vitals signs and nursing note reviewed.   Constitutional:       General: He is not in acute distress.     Appearance: He is well-developed.   HENT:      Head: Normocephalic and atraumatic.      Mouth/Throat:      Mouth: Mucous membranes are moist.   Eyes:      Conjunctiva/sclera: Conjunctivae normal.      Pupils: Pupils are equal, round, and reactive to light.   Neck:      Musculoskeletal: Normal range of motion and neck supple.   Cardiovascular:      Rate and Rhythm: Normal rate and regular rhythm.      Heart sounds: Normal heart sounds.   Pulmonary:      Effort: Pulmonary effort is normal.      Breath sounds: Normal breath sounds.   Abdominal:      General: Bowel sounds are normal.      Palpations: Abdomen is soft.   Musculoskeletal:         General: Tenderness (right hip) and deformity (RLE externally rotated and shortened) present.      Comments: Bandage over superficial left upper extremity abrasions   Skin:     General: Skin is warm and dry.   Neurological:      Mental Status: He is alert. He is confused.      Comments: Oriented to person.     Psychiatric:         Mood and Affect: Mood normal.         Behavior: Behavior normal.          Significant Labs: All pertinent labs within the past 24 hours have been reviewed.    Significant Imaging: I have reviewed and interpreted all pertinent imaging results/findings within the past 24 hours.

## 2020-09-02 ENCOUNTER — ANESTHESIA (OUTPATIENT)
Dept: SURGERY | Facility: HOSPITAL | Age: 85
DRG: 469 | End: 2020-09-02
Payer: MEDICARE

## 2020-09-02 LAB
ANION GAP SERPL CALC-SCNC: 5 MMOL/L (ref 8–16)
BASOPHILS # BLD AUTO: 0.06 K/UL (ref 0–0.2)
BASOPHILS NFR BLD: 0.8 % (ref 0–1.9)
BILIRUB UR QL STRIP: NEGATIVE
BUN SERPL-MCNC: 7 MG/DL (ref 8–23)
CALCIUM SERPL-MCNC: 7.3 MG/DL (ref 8.7–10.5)
CHLORIDE SERPL-SCNC: 108 MMOL/L (ref 95–110)
CLARITY UR: CLEAR
CO2 SERPL-SCNC: 26 MMOL/L (ref 23–29)
COLOR UR: YELLOW
CREAT SERPL-MCNC: 0.6 MG/DL (ref 0.5–1.4)
DIFFERENTIAL METHOD: ABNORMAL
EOSINOPHIL # BLD AUTO: 0.1 K/UL (ref 0–0.5)
EOSINOPHIL NFR BLD: 1.6 % (ref 0–8)
ERYTHROCYTE [DISTWIDTH] IN BLOOD BY AUTOMATED COUNT: 14.8 % (ref 11.5–14.5)
EST. GFR  (AFRICAN AMERICAN): >60 ML/MIN/1.73 M^2
EST. GFR  (NON AFRICAN AMERICAN): >60 ML/MIN/1.73 M^2
GLUCOSE SERPL-MCNC: 94 MG/DL (ref 70–110)
GLUCOSE UR QL STRIP: NEGATIVE
HCT VFR BLD AUTO: 30.1 % (ref 40–54)
HGB BLD-MCNC: 9.5 G/DL (ref 14–18)
HGB UR QL STRIP: ABNORMAL
IMM GRANULOCYTES # BLD AUTO: 0.06 K/UL (ref 0–0.04)
IMM GRANULOCYTES NFR BLD AUTO: 0.8 % (ref 0–0.5)
KETONES UR QL STRIP: ABNORMAL
LEUKOCYTE ESTERASE UR QL STRIP: NEGATIVE
LYMPHOCYTES # BLD AUTO: 1.7 K/UL (ref 1–4.8)
LYMPHOCYTES NFR BLD: 22.9 % (ref 18–48)
MAGNESIUM SERPL-MCNC: 1.8 MG/DL (ref 1.6–2.6)
MCH RBC QN AUTO: 28 PG (ref 27–31)
MCHC RBC AUTO-ENTMCNC: 31.6 G/DL (ref 32–36)
MCV RBC AUTO: 89 FL (ref 82–98)
MICROSCOPIC COMMENT: NORMAL
MONOCYTES # BLD AUTO: 1 K/UL (ref 0.3–1)
MONOCYTES NFR BLD: 13.5 % (ref 4–15)
NEUTROPHILS # BLD AUTO: 4.5 K/UL (ref 1.8–7.7)
NEUTROPHILS NFR BLD: 60.4 % (ref 38–73)
NITRITE UR QL STRIP: NEGATIVE
NRBC BLD-RTO: 0 /100 WBC
PH UR STRIP: 7 [PH] (ref 5–8)
PHOSPHATE SERPL-MCNC: 1.9 MG/DL (ref 2.7–4.5)
PLATELET # BLD AUTO: 218 K/UL (ref 150–350)
PMV BLD AUTO: 9.5 FL (ref 9.2–12.9)
POTASSIUM SERPL-SCNC: 3.4 MMOL/L (ref 3.5–5.1)
PROT UR QL STRIP: NEGATIVE
RBC # BLD AUTO: 3.39 M/UL (ref 4.6–6.2)
RBC #/AREA URNS HPF: 3 /HPF (ref 0–4)
SODIUM SERPL-SCNC: 139 MMOL/L (ref 136–145)
SP GR UR STRIP: 1.02 (ref 1–1.03)
URN SPEC COLLECT METH UR: ABNORMAL
UROBILINOGEN UR STRIP-ACNC: ABNORMAL EU/DL
WBC # BLD AUTO: 7.47 K/UL (ref 3.9–12.7)

## 2020-09-02 PROCEDURE — 85025 COMPLETE CBC W/AUTO DIFF WBC: CPT

## 2020-09-02 PROCEDURE — 99900103 DSU ONLY-NO CHARGE-INITIAL HR (STAT): Performed by: ORTHOPAEDIC SURGERY

## 2020-09-02 PROCEDURE — 88305 TISSUE EXAM BY PATHOLOGIST: CPT | Performed by: PATHOLOGY

## 2020-09-02 PROCEDURE — 25000003 PHARM REV CODE 250: Performed by: NURSE ANESTHETIST, CERTIFIED REGISTERED

## 2020-09-02 PROCEDURE — 37000009 HC ANESTHESIA EA ADD 15 MINS: Performed by: ORTHOPAEDIC SURGERY

## 2020-09-02 PROCEDURE — D9220A PRA ANESTHESIA: Mod: ANES,,, | Performed by: ANESTHESIOLOGY

## 2020-09-02 PROCEDURE — 99900104 DSU ONLY-NO CHARGE-EA ADD'L HR (STAT): Performed by: ORTHOPAEDIC SURGERY

## 2020-09-02 PROCEDURE — D9220A PRA ANESTHESIA: ICD-10-PCS | Mod: ANES,,, | Performed by: ANESTHESIOLOGY

## 2020-09-02 PROCEDURE — 88311 PR  DECALCIFY TISSUE: ICD-10-PCS | Mod: 26,,, | Performed by: PATHOLOGY

## 2020-09-02 PROCEDURE — 37000008 HC ANESTHESIA 1ST 15 MINUTES: Performed by: ORTHOPAEDIC SURGERY

## 2020-09-02 PROCEDURE — 88305 TISSUE EXAM BY PATHOLOGIST: ICD-10-PCS | Mod: 26,,, | Performed by: PATHOLOGY

## 2020-09-02 PROCEDURE — 63600175 PHARM REV CODE 636 W HCPCS: Performed by: NURSE ANESTHETIST, CERTIFIED REGISTERED

## 2020-09-02 PROCEDURE — 94760 N-INVAS EAR/PLS OXIMETRY 1: CPT

## 2020-09-02 PROCEDURE — 12000002 HC ACUTE/MED SURGE SEMI-PRIVATE ROOM

## 2020-09-02 PROCEDURE — 25000003 PHARM REV CODE 250: Performed by: ANESTHESIOLOGY

## 2020-09-02 PROCEDURE — 84100 ASSAY OF PHOSPHORUS: CPT

## 2020-09-02 PROCEDURE — 80048 BASIC METABOLIC PNL TOTAL CA: CPT

## 2020-09-02 PROCEDURE — 27201423 OPTIME MED/SURG SUP & DEVICES STERILE SUPPLY: Performed by: ORTHOPAEDIC SURGERY

## 2020-09-02 PROCEDURE — D9220A PRA ANESTHESIA: ICD-10-PCS | Mod: CRNA,,, | Performed by: NURSE ANESTHETIST, CERTIFIED REGISTERED

## 2020-09-02 PROCEDURE — C1776 JOINT DEVICE (IMPLANTABLE): HCPCS | Performed by: ORTHOPAEDIC SURGERY

## 2020-09-02 PROCEDURE — 25000003 PHARM REV CODE 250: Performed by: ORTHOPAEDIC SURGERY

## 2020-09-02 PROCEDURE — D9220A PRA ANESTHESIA: Mod: CRNA,,, | Performed by: NURSE ANESTHETIST, CERTIFIED REGISTERED

## 2020-09-02 PROCEDURE — 88305 TISSUE EXAM BY PATHOLOGIST: CPT | Mod: 26,,, | Performed by: PATHOLOGY

## 2020-09-02 PROCEDURE — 71000039 HC RECOVERY, EACH ADD'L HOUR: Performed by: ORTHOPAEDIC SURGERY

## 2020-09-02 PROCEDURE — 88311 DECALCIFY TISSUE: CPT | Mod: 26,,, | Performed by: PATHOLOGY

## 2020-09-02 PROCEDURE — 93010 EKG 12-LEAD: ICD-10-PCS | Mod: ,,, | Performed by: INTERNAL MEDICINE

## 2020-09-02 PROCEDURE — 36000710: Performed by: ORTHOPAEDIC SURGERY

## 2020-09-02 PROCEDURE — 63600175 PHARM REV CODE 636 W HCPCS: Performed by: ORTHOPAEDIC SURGERY

## 2020-09-02 PROCEDURE — 63600175 PHARM REV CODE 636 W HCPCS: Performed by: HOSPITALIST

## 2020-09-02 PROCEDURE — 81000 URINALYSIS NONAUTO W/SCOPE: CPT

## 2020-09-02 PROCEDURE — 36000711: Performed by: ORTHOPAEDIC SURGERY

## 2020-09-02 PROCEDURE — 93005 ELECTROCARDIOGRAM TRACING: CPT

## 2020-09-02 PROCEDURE — 93010 ELECTROCARDIOGRAM REPORT: CPT | Mod: ,,, | Performed by: INTERNAL MEDICINE

## 2020-09-02 PROCEDURE — 88311 DECALCIFY TISSUE: CPT | Performed by: PATHOLOGY

## 2020-09-02 PROCEDURE — 71000033 HC RECOVERY, INTIAL HOUR: Performed by: ORTHOPAEDIC SURGERY

## 2020-09-02 PROCEDURE — 83735 ASSAY OF MAGNESIUM: CPT

## 2020-09-02 PROCEDURE — 36415 COLL VENOUS BLD VENIPUNCTURE: CPT

## 2020-09-02 DEVICE — IMPLANTABLE DEVICE: Type: IMPLANTABLE DEVICE | Site: HIP | Status: FUNCTIONAL

## 2020-09-02 DEVICE — HEAD FEMORAL V40 28MM-4MM LF: Type: IMPLANTABLE DEVICE | Site: HIP | Status: FUNCTIONAL

## 2020-09-02 RX ORDER — CEFAZOLIN SODIUM 2 G/50ML
2 SOLUTION INTRAVENOUS
Status: COMPLETED | OUTPATIENT
Start: 2020-09-02 | End: 2020-09-02

## 2020-09-02 RX ORDER — SODIUM CHLORIDE 9 MG/ML
INJECTION, SOLUTION INTRAVENOUS CONTINUOUS
Status: DISCONTINUED | OUTPATIENT
Start: 2020-09-02 | End: 2020-09-02

## 2020-09-02 RX ORDER — FENTANYL CITRATE 50 UG/ML
25 INJECTION, SOLUTION INTRAMUSCULAR; INTRAVENOUS EVERY 5 MIN PRN
Status: DISCONTINUED | OUTPATIENT
Start: 2020-09-02 | End: 2020-09-02 | Stop reason: HOSPADM

## 2020-09-02 RX ORDER — ROCURONIUM BROMIDE 10 MG/ML
INJECTION, SOLUTION INTRAVENOUS
Status: DISCONTINUED | OUTPATIENT
Start: 2020-09-02 | End: 2020-09-02

## 2020-09-02 RX ORDER — SODIUM CHLORIDE, SODIUM LACTATE, POTASSIUM CHLORIDE, CALCIUM CHLORIDE 600; 310; 30; 20 MG/100ML; MG/100ML; MG/100ML; MG/100ML
INJECTION, SOLUTION INTRAVENOUS CONTINUOUS
Status: DISCONTINUED | OUTPATIENT
Start: 2020-09-02 | End: 2020-09-02

## 2020-09-02 RX ORDER — GLYCOPYRROLATE 0.2 MG/ML
INJECTION INTRAMUSCULAR; INTRAVENOUS
Status: DISCONTINUED | OUTPATIENT
Start: 2020-09-02 | End: 2020-09-02

## 2020-09-02 RX ORDER — SODIUM CHLORIDE 9 MG/ML
INJECTION, SOLUTION INTRAVENOUS CONTINUOUS
Status: DISCONTINUED | OUTPATIENT
Start: 2020-09-02 | End: 2020-09-04

## 2020-09-02 RX ORDER — ONDANSETRON HYDROCHLORIDE 2 MG/ML
INJECTION, SOLUTION INTRAMUSCULAR; INTRAVENOUS
Status: DISCONTINUED | OUTPATIENT
Start: 2020-09-02 | End: 2020-09-02

## 2020-09-02 RX ORDER — DEXAMETHASONE SODIUM PHOSPHATE 4 MG/ML
INJECTION, SOLUTION INTRA-ARTICULAR; INTRALESIONAL; INTRAMUSCULAR; INTRAVENOUS; SOFT TISSUE
Status: DISCONTINUED | OUTPATIENT
Start: 2020-09-02 | End: 2020-09-02

## 2020-09-02 RX ORDER — FENTANYL CITRATE 50 UG/ML
INJECTION, SOLUTION INTRAMUSCULAR; INTRAVENOUS
Status: DISCONTINUED | OUTPATIENT
Start: 2020-09-02 | End: 2020-09-02

## 2020-09-02 RX ORDER — NEOSTIGMINE METHYLSULFATE 1 MG/ML
INJECTION, SOLUTION INTRAVENOUS
Status: DISCONTINUED | OUTPATIENT
Start: 2020-09-02 | End: 2020-09-02

## 2020-09-02 RX ORDER — SODIUM CHLORIDE 0.9 % (FLUSH) 0.9 %
5 SYRINGE (ML) INJECTION
Status: DISCONTINUED | OUTPATIENT
Start: 2020-09-02 | End: 2020-09-11 | Stop reason: HOSPADM

## 2020-09-02 RX ORDER — HYDROMORPHONE HYDROCHLORIDE 2 MG/ML
0.2 INJECTION, SOLUTION INTRAMUSCULAR; INTRAVENOUS; SUBCUTANEOUS EVERY 5 MIN PRN
Status: DISCONTINUED | OUTPATIENT
Start: 2020-09-02 | End: 2020-09-02 | Stop reason: HOSPADM

## 2020-09-02 RX ORDER — ACETAMINOPHEN 10 MG/ML
INJECTION, SOLUTION INTRAVENOUS
Status: DISCONTINUED | OUTPATIENT
Start: 2020-09-02 | End: 2020-09-02

## 2020-09-02 RX ORDER — OXYCODONE HYDROCHLORIDE 5 MG/1
5 TABLET ORAL
Status: DISCONTINUED | OUTPATIENT
Start: 2020-09-02 | End: 2020-09-02 | Stop reason: HOSPADM

## 2020-09-02 RX ORDER — ACETAMINOPHEN 10 MG/ML
1000 INJECTION, SOLUTION INTRAVENOUS EVERY 8 HOURS
Status: COMPLETED | OUTPATIENT
Start: 2020-09-02 | End: 2020-09-03

## 2020-09-02 RX ORDER — CEFAZOLIN SODIUM 2 G/50ML
2 SOLUTION INTRAVENOUS
Status: COMPLETED | OUTPATIENT
Start: 2020-09-02 | End: 2020-09-03

## 2020-09-02 RX ORDER — PROPOFOL 10 MG/ML
VIAL (ML) INTRAVENOUS
Status: DISCONTINUED | OUTPATIENT
Start: 2020-09-02 | End: 2020-09-02

## 2020-09-02 RX ORDER — POTASSIUM CHLORIDE 7.45 MG/ML
10 INJECTION INTRAVENOUS
Status: COMPLETED | OUTPATIENT
Start: 2020-09-02 | End: 2020-09-02

## 2020-09-02 RX ORDER — LIDOCAINE HCL/PF 100 MG/5ML
SYRINGE (ML) INTRAVENOUS
Status: DISCONTINUED | OUTPATIENT
Start: 2020-09-02 | End: 2020-09-02

## 2020-09-02 RX ORDER — LIDOCAINE HYDROCHLORIDE 10 MG/ML
1 INJECTION, SOLUTION EPIDURAL; INFILTRATION; INTRACAUDAL; PERINEURAL ONCE
Status: DISCONTINUED | OUTPATIENT
Start: 2020-09-02 | End: 2020-09-02 | Stop reason: HOSPADM

## 2020-09-02 RX ADMIN — CEFAZOLIN SODIUM 2 G: 2 SOLUTION INTRAVENOUS at 10:09

## 2020-09-02 RX ADMIN — ONDANSETRON 4 MG: 2 INJECTION, SOLUTION INTRAMUSCULAR; INTRAVENOUS at 03:09

## 2020-09-02 RX ADMIN — FENTANYL CITRATE 50 MCG: 50 INJECTION, SOLUTION INTRAMUSCULAR; INTRAVENOUS at 03:09

## 2020-09-02 RX ADMIN — CEFAZOLIN SODIUM 2 G: 2 SOLUTION INTRAVENOUS at 03:09

## 2020-09-02 RX ADMIN — LIDOCAINE HYDROCHLORIDE 100 MG: 20 INJECTION, SOLUTION INTRAVENOUS at 03:09

## 2020-09-02 RX ADMIN — DEXAMETHASONE SODIUM PHOSPHATE 4 MG: 4 INJECTION, SOLUTION INTRA-ARTICULAR; INTRALESIONAL; INTRAMUSCULAR; INTRAVENOUS; SOFT TISSUE at 03:09

## 2020-09-02 RX ADMIN — ACETAMINOPHEN 1000 MG: 10 INJECTION, SOLUTION INTRAVENOUS at 11:09

## 2020-09-02 RX ADMIN — SODIUM CHLORIDE, SODIUM GLUCONATE, SODIUM ACETATE, POTASSIUM CHLORIDE, MAGNESIUM CHLORIDE, SODIUM PHOSPHATE, DIBASIC, AND POTASSIUM PHOSPHATE: .53; .5; .37; .037; .03; .012; .00082 INJECTION, SOLUTION INTRAVENOUS at 04:09

## 2020-09-02 RX ADMIN — SODIUM CHLORIDE: 0.9 INJECTION, SOLUTION INTRAVENOUS at 05:09

## 2020-09-02 RX ADMIN — MORPHINE SULFATE 4 MG: 4 INJECTION INTRAVENOUS at 05:09

## 2020-09-02 RX ADMIN — ROCURONIUM BROMIDE 35 MG: 10 INJECTION, SOLUTION INTRAVENOUS at 03:09

## 2020-09-02 RX ADMIN — PROPOFOL 60 MG: 10 INJECTION, EMULSION INTRAVENOUS at 03:09

## 2020-09-02 RX ADMIN — NEOSTIGMINE METHYLSULFATE 3 MG: 1 INJECTION INTRAVENOUS at 04:09

## 2020-09-02 RX ADMIN — VANCOMYCIN HYDROCHLORIDE 1500 MG: 1.5 INJECTION, POWDER, LYOPHILIZED, FOR SOLUTION INTRAVENOUS at 12:09

## 2020-09-02 RX ADMIN — FENTANYL CITRATE 50 MCG: 50 INJECTION, SOLUTION INTRAMUSCULAR; INTRAVENOUS at 04:09

## 2020-09-02 RX ADMIN — ACETAMINOPHEN 1000 MG: 10 INJECTION, SOLUTION INTRAVENOUS at 03:09

## 2020-09-02 RX ADMIN — SODIUM CHLORIDE, SODIUM GLUCONATE, SODIUM ACETATE, POTASSIUM CHLORIDE, MAGNESIUM CHLORIDE, SODIUM PHOSPHATE, DIBASIC, AND POTASSIUM PHOSPHATE: .53; .5; .37; .037; .03; .012; .00082 INJECTION, SOLUTION INTRAVENOUS at 03:09

## 2020-09-02 RX ADMIN — POTASSIUM CHLORIDE 10 MEQ: 7.46 INJECTION, SOLUTION INTRAVENOUS at 09:09

## 2020-09-02 RX ADMIN — GLYCOPYRROLATE 0.4 MG: 0.2 INJECTION INTRAMUSCULAR; INTRAVENOUS at 04:09

## 2020-09-02 RX ADMIN — MORPHINE SULFATE 6 MG: 2 INJECTION, SOLUTION INTRAMUSCULAR; INTRAVENOUS at 12:09

## 2020-09-02 RX ADMIN — POTASSIUM CHLORIDE 10 MEQ: 7.46 INJECTION, SOLUTION INTRAVENOUS at 12:09

## 2020-09-02 NOTE — OP NOTE
Orthopaedic Surgery Operative Report    DATE OF PROCEDURE: 09/02/2020  PREOPERATIVE DIAGNOSIS: right femoral neck fracture.   POSTOPERATIVE DIAGNOSIS: right femoral neck fracture.   PROCEDURE PERFORMED: right hip hemiarthroplasty.   SURGEON: Arthur Porter M.D.   ANESTHESIA: General endotracheal.   ESTIMATED BLOOD LOSS: 200 cc.   IMPLANTS: Hoople Accolade II 6 stem with a bipolar head  COMPLICATIONS: none     INDICATIONS FOR PROCEDURE: The patient is an 89 y.o. male who had a low energy fall sustaining a right femoral neck fracture. The patient presented to the Emergency Department and was evaluated by Orthopaedics and Internal Medicine. This procedure, as well as, alternatives to this procedure was discussed at length with the patient. Risks and benefits were also discussed. Risks include but are not limited to bleeding, infection, numbness, scarring, damage to major neurovascular structures, limb length/rotation discrepancy, failure of hardware, need for further surgery, loss of function, myocardial infarction, deep venous thrombosis, pulmonary embolism, nonunion, malunion and death. Patient understood these well and consented for the procedure as described.    PROCEDURE IN DETAIL: The patient was identified in the preoperative holding area and site was marked. The patient was wheeled into the Operating Room and general endotracheal anesthesia was induced in the patient's hospital bed. The patient was then moved over to the operative table and placed into a lateral decubitus position with the fractured hip up. The operative hip and lower extremity were then prepped and draped in the usual sterile fashion. A timeout was taken to confirm the patient, site, surgery, surgeon and administration of preoperative antibiotics. All agreed and we proceeded. Using a standard posterior approach, an approximately 15 cm skin incision was made followed by subcutaneous tissue dissection. The appropriate level of the  iliotibial band and gluteal fascia was identified and incised in line with the skin incision. Bursa was minimally excised. The short external rotators were identified and the gluteus minimus was split from the piriformis and retracted proximally. The piriformis was tagged with a #2 Ethibond suture and incised at its insertion. The obturator internus and conjoined tendon were also incised and tagged at their insertion. At this point, a capsulotomy was made and tagged with a #1 Vicryl suture for repair. The capsulotomy was carried both inferiorly and superiorly along the remnant of the femoral neck for added exposure.  The femoral neck fracture was identified and it was noted to be at the appropriate height above the lesser trochanter at the calcar. The femoral head was then removed with a corkscrew. The ligamentum teres remnant was removed from within the acetabular fovea using electrocautery.  The acetabulum was sized and a 46 mm head trial was noted be appropriate. This was then trialed and found to have sufficient congruency with the acetabulum. Attention was then given to the femoral canal, starting with a . Box cutting was followed by the canal entry reamer, followed by broaching.  We trialed up to a size 6 and this was found to be most stable. The femoral stem was placed in the appropriate position. A trialling was again undertaken at this time with a 46 mm head, and it was noted to be stable up to 90 degrees of internal rotation and 90 degrees of flexion. Final head and sleeve were placed. The hip was again reduced and noted to be stable. The wound was copiously irrigated again with normal saline solution. The short external rotators and capsule were repaired to the greater trochanter with two drill holes. The fascia was closed with 0 and #1 Vicryl suture in figure-of-eight fashion. Subcutaneous tissues were closed with 3-0 Vicryl suture and the skin with 3-0 Nylon in a horizontal mattress fashion.  Sterile dressings were applied. All instrument and sponge counts were reported correct at the end of the case. There were no complications. The patient was put back into a supine position. The patient was extubated, awakened and taken to the post anesthesia care unit in stable condition.     PLAN FOR THE PATIENT: Patient will be transferred back to the hospital floor for physical therapy directed early mobilization of operative limb.     Arthur Porter MD  [facility]

## 2020-09-02 NOTE — ANESTHESIA PREPROCEDURE EVALUATION
09/02/2020  Kevyn Terrell Jr. is a 89 y.o., male.    Anesthesia Evaluation    I have reviewed the Patient Summary Reports.    I have reviewed the Nursing Notes.       Review of Systems  Anesthesia Hx:  No problems with previous Anesthesia    Hematology/Oncology:        Current/Recent Cancer. Oncology Comments: Prostate cancer with bone mets   Cardiovascular:  Cardiovascular Normal     Pulmonary:  Pulmonary Normal    Psych:   Psychiatric History          Physical Exam  General:  Well nourished    Airway/Jaw/Neck:  Airway Findings: Mouth Opening: Normal Tongue: Normal  TM Distance: Normal, at least 6 cm  Jaw/Neck Findings:  Neck ROM: Normal ROM     Eyes/Ears/Nose:  Eyes/Ears/Nose Findings:    Dental:  Dental Findings: Edentulous   Chest/Lungs:  Chest/Lungs Findings: Normal Respiratory Rate     Heart/Vascular:  Heart Findings: Rate: Normal  Rhythm: Regular Rhythm        Mental Status:  Mental Status Findings:  Cooperative, Alert and Oriented         Anesthesia Plan  Type of Anesthesia, risks & benefits discussed:  Anesthesia Type:  general  Patient's Preference: General  Intra-op Monitoring Plan: standard ASA monitors  Intra-op Monitoring Plan Comments:   Post Op Pain Control Plan: per primary service following discharge from PACU and multimodal analgesia  Post Op Pain Control Plan Comments:   Induction:   IV  Beta Blocker:  Patient is not currently on a Beta-Blocker (No further documentation required).       Informed Consent: Patient understands risks and agrees with Anesthesia plan.  Questions answered. Anesthesia consent signed with patient.  ASA Score: 3     Day of Surgery Review of History & Physical:    H&P update referred to the surgeon.         Ready For Surgery From Anesthesia Perspective.

## 2020-09-02 NOTE — ASSESSMENT & PLAN NOTE
Patient has hypokalemia which is currently uncontrolled. Last electrolytes reviewed-   Recent Labs   Lab 09/01/20  0530 09/02/20  0547   K 3.8 3.4*   . Will replace potassium and monitor electrolytes closely. Continuous telemetry.

## 2020-09-02 NOTE — SUBJECTIVE & OBJECTIVE
Interval History:  Patient seen and examined.  Remains pleasantly confused.  Surgery today.    Review of Systems   Unable to perform ROS: Dementia     Objective:     Vital Signs (Most Recent):  Temp: 98.2 °F (36.8 °C) (09/02/20 1337)  Pulse: 77 (09/02/20 1337)  Resp: 18 (09/02/20 1337)  BP: 133/70 (09/02/20 1337)  SpO2: 95 % (09/02/20 1337) Vital Signs (24h Range):  Temp:  [97.2 °F (36.2 °C)-99.5 °F (37.5 °C)] 98.2 °F (36.8 °C)  Pulse:  [66-78] 77  Resp:  [16-18] 18  SpO2:  [90 %-95 %] 95 %  BP: (125-137)/(58-95) 133/70     Weight: 59.9 kg (132 lb)  Body mass index is 20.07 kg/m².    Intake/Output Summary (Last 24 hours) at 9/2/2020 1414  Last data filed at 9/2/2020 0500  Gross per 24 hour   Intake 0 ml   Output --   Net 0 ml      Physical Exam  Vitals signs and nursing note reviewed.   Constitutional:       General: He is not in acute distress.     Appearance: He is well-developed.   HENT:      Head: Normocephalic and atraumatic.      Mouth/Throat:      Mouth: Mucous membranes are moist.   Eyes:      Conjunctiva/sclera: Conjunctivae normal.      Pupils: Pupils are equal, round, and reactive to light.   Neck:      Musculoskeletal: Normal range of motion and neck supple.   Cardiovascular:      Rate and Rhythm: Normal rate and regular rhythm.      Heart sounds: Normal heart sounds.   Pulmonary:      Effort: Pulmonary effort is normal.      Breath sounds: Normal breath sounds.   Abdominal:      General: Bowel sounds are normal.      Palpations: Abdomen is soft.   Musculoskeletal:         General: Tenderness (right hip) and deformity (RLE externally rotated and shortened) present.      Comments: Bandage over superficial left upper extremity abrasions   Skin:     General: Skin is warm and dry.   Neurological:      Mental Status: He is alert. He is confused.      Comments: Oriented to person.     Psychiatric:         Mood and Affect: Mood normal.         Behavior: Behavior normal.         Significant Labs: All pertinent  labs within the past 24 hours have been reviewed.    Significant Imaging: I have reviewed and interpreted all pertinent imaging results/findings within the past 24 hours.

## 2020-09-02 NOTE — ASSESSMENT & PLAN NOTE
Right hip x-ray reviewed  Orthopedics consulted, plan for surgery today  PT/OT consult after surgery   for dc planning.  Pain control

## 2020-09-02 NOTE — PLAN OF CARE
Plan of care reviewed with pt,verbalized understanding. Right hip tender to touch. NPO due to tomorrow.s surgery. Hepiclens bath performed. U/A sample was sent to lab. Call light kept within reach, bed in locked and lowest position, side rails up x2.

## 2020-09-02 NOTE — PROGRESS NOTES
[facility] Medicine  Progress Note    Patient Name: Kevyn Terrell Jr.  MRN: [id number]  Patient Class: IP- Inpatient   Admission Date: 8/30/2020  Length of Stay: 2 days  Attending Physician: Maricruz Huber MD  Primary Care Provider: Carole Rangel MD        Subjective:     Principal Problem:Closed right hip fracture, initial encounter        HPI:  Kevyn Terrell Jr. is a 89 y.o. male with a PMHx of Alzheimer's disease and metastatic cancer who presented to the ED via EMS with right hip pain following a fall earlier today.  Pt's granddaughter states that she had gone to the grocery store and when she returned, she found pt on the ground.  He had apparently fallen down 2 stairs at his trailer.  Pt is pleasantly confused and does not remember the incident.  History is obtained from the granddaughter.  Pt was recently medicated for pain and has no complaints at this time.        Overview/Hospital Course:  No notes on file    Interval History:  Patient seen and examined.  Remains pleasantly confused.  Surgery today.    Review of Systems   Unable to perform ROS: Dementia     Objective:     Vital Signs (Most Recent):  Temp: 98.2 °F (36.8 °C) (09/02/20 1337)  Pulse: 77 (09/02/20 1337)  Resp: 18 (09/02/20 1337)  BP: 133/70 (09/02/20 1337)  SpO2: 95 % (09/02/20 1337) Vital Signs (24h Range):  Temp:  [97.2 °F (36.2 °C)-99.5 °F (37.5 °C)] 98.2 °F (36.8 °C)  Pulse:  [66-78] 77  Resp:  [16-18] 18  SpO2:  [90 %-95 %] 95 %  BP: (125-137)/(58-95) 133/70     Weight: 59.9 kg (132 lb)  Body mass index is 20.07 kg/m².    Intake/Output Summary (Last 24 hours) at 9/2/2020 1414  Last data filed at 9/2/2020 0500  Gross per 24 hour   Intake 0 ml   Output --   Net 0 ml      Physical Exam  Vitals signs and nursing note reviewed.   Constitutional:       General: He is not in acute distress.     Appearance: He is well-developed.   HENT:      Head: Normocephalic and atraumatic.      Mouth/Throat:      Mouth:  Mucous membranes are moist.   Eyes:      Conjunctiva/sclera: Conjunctivae normal.      Pupils: Pupils are equal, round, and reactive to light.   Neck:      Musculoskeletal: Normal range of motion and neck supple.   Cardiovascular:      Rate and Rhythm: Normal rate and regular rhythm.      Heart sounds: Normal heart sounds.   Pulmonary:      Effort: Pulmonary effort is normal.      Breath sounds: Normal breath sounds.   Abdominal:      General: Bowel sounds are normal.      Palpations: Abdomen is soft.   Musculoskeletal:         General: Tenderness (right hip) and deformity (RLE externally rotated and shortened) present.      Comments: Bandage over superficial left upper extremity abrasions   Skin:     General: Skin is warm and dry.   Neurological:      Mental Status: He is alert. He is confused.      Comments: Oriented to person.     Psychiatric:         Mood and Affect: Mood normal.         Behavior: Behavior normal.         Significant Labs: All pertinent labs within the past 24 hours have been reviewed.    Significant Imaging: I have reviewed and interpreted all pertinent imaging results/findings within the past 24 hours.      Assessment/Plan:      * Closed right hip fracture, initial encounter  Right hip x-ray reviewed  Orthopedics consulted, plan for surgery today  PT/OT consult after surgery   for dc planning.  Pain control        Hypophosphatemia  On replacement protocol.      Alzheimer's dementia without behavioral disturbance  Chronic.  Fall and delirium precautions.  1) During the day, please open the shades and turn on the lights- If patient is in private room, please make sure they are close to the window.    2) Make sure the correct date and time is written on the whiteboard, as well as the current care team  3) Minimize interruptions at night-time, close shades and turn off TV.   4) When possible, during daytime make sure patient is in chair and provide activities that engage patient.  5)  Please make sure patient has hearing aids and glasses while awake.        Prostate cancer metastatic to bone  Followed by Hematology/Oncology at [facility].      Hypokalemia  Patient has hypokalemia which is currently uncontrolled. Last electrolytes reviewed-   Recent Labs   Lab 09/01/20  0530 09/02/20  0547   K 3.8 3.4*   . Will replace potassium and monitor electrolytes closely. Continuous telemetry.    Hypocalcemia  Replacement ordered.        VTE Risk Mitigation (From admission, onward)         Ordered     IP VTE HIGH RISK PATIENT  Once      09/02/20 1223     Place sequential compression device  Until discontinued      09/02/20 1223     Place DONAL hose  Until discontinued      09/02/20 1223     Place sequential compression device  Until discontinued      08/31/20 0212                Discharge Planning   BREANNE: 9/4/2020     Code Status: Full Code   Is the patient medically ready for discharge?:     Reason for patient still in hospital (select all that apply): Treatment  Discharge Plan A: Hospice/home                  Maricruz Huber MD  Department of Hospital Medicine   [facility]

## 2020-09-02 NOTE — NURSING
Spoke with pt's daughter, Nichelle. Let her know that pt is scheduled for surgery at 1400. She wishes to be updated after surgery. Her contact information is in pt's chart.

## 2020-09-02 NOTE — PLAN OF CARE
VSS. Denies complaints. Criteria met for transfer per Anesthesia. Transferred to room 423. Report to Silvia CROW. Tolerated po fluids. States has pain after transfer. Silvia to medicate. Communicates mainly that he wants to go to sleep and be left alone. NAD noted.

## 2020-09-02 NOTE — TRANSFER OF CARE
"Anesthesia Transfer of Care Note    Patient: Kevyn Terrell Jr.    Procedure(s) Performed: Procedure(s) (LRB):  HEMIARTHROPLASTY, HIP, peg board, 1st assist (Right)    Patient location: PACU    Anesthesia Type: general    Transport from OR: Transported from OR on 2-3 L/min O2 by NC with adequate spontaneous ventilation    Post pain: adequate analgesia    Post assessment: no apparent anesthetic complications and tolerated procedure well    Post vital signs: stable    Level of consciousness: awake    Nausea/Vomiting: no nausea/vomiting    Complications: none    Transfer of care protocol was followed      Last vitals:   Visit Vitals  /70 (BP Location: Right arm, Patient Position: Lying)   Pulse 77   Temp 36.8 °C (98.2 °F) (Skin)   Resp 18   Ht 5' 8" (1.727 m)   Wt 59.9 kg (132 lb)   SpO2 95%   BMI 20.07 kg/m²     "

## 2020-09-02 NOTE — PLAN OF CARE
Phone consent obtained from daughter for surgery, anesthesia and blood.  Pt unaware of place or situation.  Can states name.

## 2020-09-02 NOTE — PROGRESS NOTES
Primary nurse Silvia noticed a fentanyl patch on patient's left arm. No fentanyl patch ordered in the MAR. Dr. Huber called to get orders to remove patch.

## 2020-09-03 LAB
ANION GAP SERPL CALC-SCNC: 10 MMOL/L (ref 8–16)
BASOPHILS # BLD AUTO: 0.01 K/UL (ref 0–0.2)
BASOPHILS NFR BLD: 0.2 % (ref 0–1.9)
BUN SERPL-MCNC: 11 MG/DL (ref 8–23)
CALCIUM SERPL-MCNC: 7.3 MG/DL (ref 8.7–10.5)
CHLORIDE SERPL-SCNC: 106 MMOL/L (ref 95–110)
CO2 SERPL-SCNC: 23 MMOL/L (ref 23–29)
CREAT SERPL-MCNC: 0.7 MG/DL (ref 0.5–1.4)
DIFFERENTIAL METHOD: ABNORMAL
EOSINOPHIL # BLD AUTO: 0 K/UL (ref 0–0.5)
EOSINOPHIL NFR BLD: 0 % (ref 0–8)
ERYTHROCYTE [DISTWIDTH] IN BLOOD BY AUTOMATED COUNT: 14.8 % (ref 11.5–14.5)
EST. GFR  (AFRICAN AMERICAN): >60 ML/MIN/1.73 M^2
EST. GFR  (NON AFRICAN AMERICAN): >60 ML/MIN/1.73 M^2
GLUCOSE SERPL-MCNC: 133 MG/DL (ref 70–110)
HCT VFR BLD AUTO: 29.9 % (ref 40–54)
HGB BLD-MCNC: 9.1 G/DL (ref 14–18)
IMM GRANULOCYTES # BLD AUTO: 0.03 K/UL (ref 0–0.04)
IMM GRANULOCYTES NFR BLD AUTO: 0.6 % (ref 0–0.5)
LYMPHOCYTES # BLD AUTO: 1 K/UL (ref 1–4.8)
LYMPHOCYTES NFR BLD: 20.7 % (ref 18–48)
MAGNESIUM SERPL-MCNC: 1.9 MG/DL (ref 1.6–2.6)
MCH RBC QN AUTO: 27.2 PG (ref 27–31)
MCHC RBC AUTO-ENTMCNC: 30.4 G/DL (ref 32–36)
MCV RBC AUTO: 89 FL (ref 82–98)
MONOCYTES # BLD AUTO: 0.4 K/UL (ref 0.3–1)
MONOCYTES NFR BLD: 9.1 % (ref 4–15)
NEUTROPHILS # BLD AUTO: 3.4 K/UL (ref 1.8–7.7)
NEUTROPHILS NFR BLD: 69.4 % (ref 38–73)
NRBC BLD-RTO: 0 /100 WBC
PHOSPHATE SERPL-MCNC: 2.7 MG/DL (ref 2.7–4.5)
PLATELET # BLD AUTO: 232 K/UL (ref 150–350)
PMV BLD AUTO: 9.8 FL (ref 9.2–12.9)
POTASSIUM SERPL-SCNC: 4 MMOL/L (ref 3.5–5.1)
RBC # BLD AUTO: 3.35 M/UL (ref 4.6–6.2)
SODIUM SERPL-SCNC: 139 MMOL/L (ref 136–145)
WBC # BLD AUTO: 4.83 K/UL (ref 3.9–12.7)

## 2020-09-03 PROCEDURE — 36415 COLL VENOUS BLD VENIPUNCTURE: CPT

## 2020-09-03 PROCEDURE — 97166 OT EVAL MOD COMPLEX 45 MIN: CPT

## 2020-09-03 PROCEDURE — 25000003 PHARM REV CODE 250: Performed by: HOSPITALIST

## 2020-09-03 PROCEDURE — 25000003 PHARM REV CODE 250: Performed by: ORTHOPAEDIC SURGERY

## 2020-09-03 PROCEDURE — 83735 ASSAY OF MAGNESIUM: CPT

## 2020-09-03 PROCEDURE — 85025 COMPLETE CBC W/AUTO DIFF WBC: CPT

## 2020-09-03 PROCEDURE — 97530 THERAPEUTIC ACTIVITIES: CPT

## 2020-09-03 PROCEDURE — 63600175 PHARM REV CODE 636 W HCPCS: Performed by: ORTHOPAEDIC SURGERY

## 2020-09-03 PROCEDURE — 97116 GAIT TRAINING THERAPY: CPT

## 2020-09-03 PROCEDURE — 80048 BASIC METABOLIC PNL TOTAL CA: CPT

## 2020-09-03 PROCEDURE — 97162 PT EVAL MOD COMPLEX 30 MIN: CPT

## 2020-09-03 PROCEDURE — 94761 N-INVAS EAR/PLS OXIMETRY MLT: CPT

## 2020-09-03 PROCEDURE — 84100 ASSAY OF PHOSPHORUS: CPT

## 2020-09-03 PROCEDURE — 12000002 HC ACUTE/MED SURGE SEMI-PRIVATE ROOM

## 2020-09-03 RX ORDER — DOCUSATE SODIUM 100 MG/1
100 CAPSULE, LIQUID FILLED ORAL 2 TIMES DAILY
Status: DISCONTINUED | OUTPATIENT
Start: 2020-09-03 | End: 2020-09-11 | Stop reason: HOSPADM

## 2020-09-03 RX ORDER — SYRING-NEEDL,DISP,INSUL,0.3 ML 29 G X1/2"
296 SYRINGE, EMPTY DISPOSABLE MISCELLANEOUS DAILY PRN
Status: DISCONTINUED | OUTPATIENT
Start: 2020-09-03 | End: 2020-09-11 | Stop reason: HOSPADM

## 2020-09-03 RX ORDER — POLYETHYLENE GLYCOL 3350 17 G/17G
17 POWDER, FOR SOLUTION ORAL 2 TIMES DAILY
Status: DISCONTINUED | OUTPATIENT
Start: 2020-09-03 | End: 2020-09-11 | Stop reason: HOSPADM

## 2020-09-03 RX ADMIN — SODIUM CHLORIDE: 0.9 INJECTION, SOLUTION INTRAVENOUS at 03:09

## 2020-09-03 RX ADMIN — MORPHINE SULFATE 4 MG: 4 INJECTION INTRAVENOUS at 07:09

## 2020-09-03 RX ADMIN — MAGNESIUM CITRATE 296 ML: 1.75 LIQUID ORAL at 01:09

## 2020-09-03 RX ADMIN — ACETAMINOPHEN 1000 MG: 10 INJECTION, SOLUTION INTRAVENOUS at 07:09

## 2020-09-03 RX ADMIN — POLYETHYLENE GLYCOL 3350 17 G: 17 POWDER, FOR SOLUTION ORAL at 09:09

## 2020-09-03 RX ADMIN — DOCUSATE SODIUM 100 MG: 100 CAPSULE, LIQUID FILLED ORAL at 09:09

## 2020-09-03 RX ADMIN — MORPHINE SULFATE 4 MG: 4 INJECTION INTRAVENOUS at 01:09

## 2020-09-03 RX ADMIN — ACETAMINOPHEN 1000 MG: 10 INJECTION, SOLUTION INTRAVENOUS at 01:09

## 2020-09-03 RX ADMIN — CEFAZOLIN SODIUM 2 G: 2 SOLUTION INTRAVENOUS at 03:09

## 2020-09-03 RX ADMIN — CEFAZOLIN SODIUM 2 G: 2 SOLUTION INTRAVENOUS at 10:09

## 2020-09-03 RX ADMIN — Medication 6 MG: at 09:09

## 2020-09-03 NOTE — PLAN OF CARE
POC reviewed and updated. Verbalized understanding. Patient monitored throughout shift. No acute changes. AOX4. Afebrile. VSS. Continuous cardiac monitoring in place. PIV flushes well & infusing with dressing CDI. Pt incontinent with brief. Hip incisions dressed with hydrogel cdi. Bed left in low position, call light within reach, and bed alarm on. All needs met. Will continue to monitor.

## 2020-09-03 NOTE — ASSESSMENT & PLAN NOTE
Right hip x-ray reviewed  Status post right hip MRI arthroplasty with Dr. Porter  PT OT following.   for dc planning- current plan is home with re-initiation of hospice tomorrow  Pain control

## 2020-09-03 NOTE — PROGRESS NOTES
[facility] Hospital Medicine  Progress Note    Patient Name: Kevyn Terrell Jr.  MRN: [id number]  Patient Class: IP- Inpatient   Admission Date: 8/30/2020  Length of Stay: 3 days  Attending Physician: Maricruz Huber MD  Primary Care Provider: Carole Rangel MD        Subjective:     Principal Problem:Closed right hip fracture, initial encounter        HPI:  Kevyn Terrell Jr. is a 89 y.o. male with a PMHx of Alzheimer's disease and metastatic cancer who presented to the ED via EMS with right hip pain following a fall earlier today.  Pt's granddaughter states that she had gone to the grocery store and when she returned, she found pt on the ground.  He had apparently fallen down 2 stairs at his trailer.  Pt is pleasantly confused and does not remember the incident.  History is obtained from the granddaughter.  Pt was recently medicated for pain and has no complaints at this time.        Overview/Hospital Course:  No notes on file    Interval History:  Patient seen and examined.  Remains pleasantly confused.    Review of Systems   Unable to perform ROS: Dementia     Objective:     Vital Signs (Most Recent):  Temp: 98.1 °F (36.7 °C) (09/03/20 1535)  Pulse: 97 (09/03/20 1535)  Resp: 18 (09/03/20 1535)  BP: 133/64 (09/03/20 1535)  SpO2: 96 % (09/03/20 1535) Vital Signs (24h Range):  Temp:  [96.7 °F (35.9 °C)-98.1 °F (36.7 °C)] 98.1 °F (36.7 °C)  Pulse:  [59-97] 97  Resp:  [14-29] 18  SpO2:  [94 %-99 %] 96 %  BP: (131-177)/() 133/64     Weight: 59.9 kg (132 lb)  Body mass index is 20.07 kg/m².    Intake/Output Summary (Last 24 hours) at 9/3/2020 1654  Last data filed at 9/3/2020 0800  Gross per 24 hour   Intake 2830 ml   Output --   Net 2830 ml      Physical Exam  Vitals signs and nursing note reviewed.   Constitutional:       General: He is not in acute distress.     Appearance: He is well-developed.   HENT:      Head: Normocephalic and atraumatic.      Mouth/Throat:      Mouth: Mucous  membranes are moist.   Eyes:      Conjunctiva/sclera: Conjunctivae normal.      Pupils: Pupils are equal, round, and reactive to light.   Neck:      Musculoskeletal: Normal range of motion and neck supple.   Cardiovascular:      Rate and Rhythm: Normal rate and regular rhythm.      Heart sounds: Normal heart sounds.   Pulmonary:      Effort: Pulmonary effort is normal.      Breath sounds: Normal breath sounds.   Abdominal:      General: Bowel sounds are normal.      Palpations: Abdomen is soft.   Musculoskeletal:         General: Tenderness (right hip) present.      Comments: Surgical bandage clean/dry/intact   Skin:     General: Skin is warm and dry.   Neurological:      Mental Status: He is alert. He is confused.      Comments: Oriented to person.     Psychiatric:         Mood and Affect: Mood normal.         Behavior: Behavior normal.         Significant Labs: All pertinent labs within the past 24 hours have been reviewed.    Significant Imaging: I have reviewed and interpreted all pertinent imaging results/findings within the past 24 hours.      Assessment/Plan:      * Closed right hip fracture, initial encounter  Right hip x-ray reviewed  Status post right hip MRI arthroplasty with Dr. Porter  PT OT following.   for dc planning- current plan is home with re-initiation of hospice tomorrow  Pain control        Hypophosphatemia  On replacement protocol.      Alzheimer's dementia without behavioral disturbance  Chronic.  Fall and delirium precautions.  1) During the day, please open the shades and turn on the lights- If patient is in private room, please make sure they are close to the window.    2) Make sure the correct date and time is written on the whiteboard, as well as the current care team  3) Minimize interruptions at night-time, close shades and turn off TV.   4) When possible, during daytime make sure patient is in chair and provide activities that engage patient.  5) Please make sure patient  has hearing aids and glasses while awake.        Prostate cancer metastatic to bone  Followed by Hematology/Oncology at [facility].      Hypokalemia  Patient has hypokalemia which is currently controlled. Last electrolytes reviewed-   Recent Labs   Lab 09/02/20  0547 09/03/20  0444   K 3.4* 4.0   . Will replace potassium and monitor electrolytes closely. Continuous telemetry.    Hypocalcemia  Replacement ordered.        VTE Risk Mitigation (From admission, onward)         Ordered     Place sequential compression device  Until discontinued      09/02/20 1613     Place DONAL hose  Until discontinued      09/02/20 1613     IP VTE HIGH RISK PATIENT  Once      09/02/20 1613     Place sequential compression device  Until discontinued      08/31/20 0212                Discharge Planning   BREANNE: 9/4/2020     Code Status: DNR   Is the patient medically ready for discharge?:     Reason for patient still in hospital (select all that apply): Patient trending condition  Discharge Plan A: Hospice/home                  Maricruz Huber MD  Department of Hospital Medicine   [facility]

## 2020-09-03 NOTE — ASSESSMENT & PLAN NOTE
Patient has hypokalemia which is currently controlled. Last electrolytes reviewed-   Recent Labs   Lab 09/02/20  0547 09/03/20  0444   K 3.4* 4.0   . Will replace potassium and monitor electrolytes closely. Continuous telemetry.

## 2020-09-03 NOTE — SUBJECTIVE & OBJECTIVE
Interval History:  Patient seen and examined.  Remains pleasantly confused.    Review of Systems   Unable to perform ROS: Dementia     Objective:     Vital Signs (Most Recent):  Temp: 98.1 °F (36.7 °C) (09/03/20 1535)  Pulse: 97 (09/03/20 1535)  Resp: 18 (09/03/20 1535)  BP: 133/64 (09/03/20 1535)  SpO2: 96 % (09/03/20 1535) Vital Signs (24h Range):  Temp:  [96.7 °F (35.9 °C)-98.1 °F (36.7 °C)] 98.1 °F (36.7 °C)  Pulse:  [59-97] 97  Resp:  [14-29] 18  SpO2:  [94 %-99 %] 96 %  BP: (131-177)/() 133/64     Weight: 59.9 kg (132 lb)  Body mass index is 20.07 kg/m².    Intake/Output Summary (Last 24 hours) at 9/3/2020 1654  Last data filed at 9/3/2020 0800  Gross per 24 hour   Intake 2830 ml   Output --   Net 2830 ml      Physical Exam  Vitals signs and nursing note reviewed.   Constitutional:       General: He is not in acute distress.     Appearance: He is well-developed.   HENT:      Head: Normocephalic and atraumatic.      Mouth/Throat:      Mouth: Mucous membranes are moist.   Eyes:      Conjunctiva/sclera: Conjunctivae normal.      Pupils: Pupils are equal, round, and reactive to light.   Neck:      Musculoskeletal: Normal range of motion and neck supple.   Cardiovascular:      Rate and Rhythm: Normal rate and regular rhythm.      Heart sounds: Normal heart sounds.   Pulmonary:      Effort: Pulmonary effort is normal.      Breath sounds: Normal breath sounds.   Abdominal:      General: Bowel sounds are normal.      Palpations: Abdomen is soft.   Musculoskeletal:         General: Tenderness (right hip) present.      Comments: Surgical bandage clean/dry/intact   Skin:     General: Skin is warm and dry.   Neurological:      Mental Status: He is alert. He is confused.      Comments: Oriented to person.     Psychiatric:         Mood and Affect: Mood normal.         Behavior: Behavior normal.         Significant Labs: All pertinent labs within the past 24 hours have been reviewed.    Significant Imaging: I have  reviewed and interpreted all pertinent imaging results/findings within the past 24 hours.

## 2020-09-03 NOTE — PT/OT/SLP EVAL
Occupational Therapy   Evaluation    Name: Kevyn Terrell Jr.  MRN: [id number]  Admitting Diagnosis:  Closed right hip fracture, initial encounter 1 Day Post-Op    Recommendations:     Discharge Recommendations: nursing facility, skilled  Discharge Equipment Recommendations:  bath bench  Barriers to discharge:       Assessment:     Kevyn Terrell Jr. is a 89 y.o. male with a medical diagnosis of Closed right hip fracture, initial encounter.  He presents with a decline in functional status due to the listed impairments, impacting ADLs and functional mobility.    Pt found alert, oriented to self only and needed encouragement to get OOB. He was able to follow all simple commands. He require total assist of 2 persons for supine<>sit and Max A of one person and minimal to moderate assist of one person for sit<>stand and ongoing cues for walker management and safety and to avoid R hip IR    Granddaughter is his caregiver and she was present and  was very receptive to all education provided on posterior precautions and UE exercise program and on transfer techniques and bath bench recommendation. Recommend OT treatment to reinforce use of posterior precautions and safety & independence with self care & functional mobility.    Performance deficits affecting function: weakness, gait instability, decreased ROM, decreased lower extremity function, impaired balance, impaired endurance, decreased safety awareness, impaired self care skills, impaired cognition, pain, orthopedic precautions, impaired functional mobilty, decreased coordination.      Rehab Prognosis: Fair; patient would benefit from acute skilled OT services to address these deficits and reach maximum level of function.       Plan:     Patient to be seen 3 x/week to address the above listed problems via self-care/home management, therapeutic activities, therapeutic exercises  · Plan of Care Expires: 09/09/20  · Plan of Care Reviewed with: patient,  grandchild(le)    Subjective     Chief Complaint: I am cold.  Patient/Family Comments/goals: Let me go to sleep.    Occupational Profile:  Living Environment: Pt lives with his granddaughter and her children in a mobile home with 4 THADDEUS and a tub.  Previous level of function: Pt was able to feed himself and perform grooming tasks with set-up but needed assist for bathing and dressing and is incontinent  Roles and Routines: Pt has been getting hospice care due to metastatic cancer.  Equipment Used at Home:  hospital bed, walker, rolling, wheelchair, shower chair  Assistance upon Discharge: Family will assist pt at home. Patient will need 24 hour assistance and supervision for safety.    Pain/Comfort:  · Pain Rating 1: 0/10  · Location - Side 1: Right  · Location 1: hip  · Pain Addressed 1: Cessation of Activity, Reposition(c/o pain only with bed mobility)  · Pain Rating Post-Intervention 1: 0/10    Patients cultural, spiritual, Restorationist conflicts given the current situation:      Objective:     Communicated with: Denilson nurse and Kamala PT prior to session.  Patient found supine with bed alarm, peripheral IV, SCD, knee immobilizer upon OT entry to room.    General Precautions: Standard, fall   Orthopedic Precautions:RLE weight bearing as tolerated, RLE posterior precautions   Braces: Knee immobilizer     Occupational Performance:    Bed Mobility:    · Patient completed Scooting/Bridging with total assistance and 2 persons  · Patient completed Supine to Sit with total assistance and 2 persons  · Patient completed Sit to Supine with total assistance and 2 persons    Functional Mobility/Transfers:  · Patient completed Sit <> Stand Transfer with total assistance, of 2 persons and cues for hand placement  with  rolling walker and bed raised   Functional Mobility: Pt walked from bed<>hallway with walker and Max A x1, Min-Mod x1 for balance and walker management. Cues needed for taking smaller steps and walker  management with turning and backing up to sit.      Activities of Daily Living:  · Feeding:  stand by assistance and set-up and cues to initiate grasping mild carton and drinking from a straw. Pt refused to eat his sandwich.  · Grooming: stand by assistance and cues to initiate combing hair seated EOB  · Lower Body Dressing: dependence to don socks  · Toileting: dependence and is incontinent of B & B    Cognitive/Visual Perceptual:  Cognitive/Psychosocial Skills:     -       Oriented to: Person and Place   -       Follows Commands/attention:Follows one-step commands  -       Communication: clear/fluent  -       Memory: Impaired STM and Poor immediate recall  -       Safety awareness/insight to disability: impaired   -       Mood/Affect/Coping skills/emotional control: Appropriate to situation and limited cooperation but responded to encouragement    Physical Exam:  Postural examination/scapula alignment:    -       Rounded shoulders  -       Forward head  -       Kyphosis  Dominant hand:    -       right  Upper Extremity Range of Motion:     -       Right Upper Extremity: WFL  -       Left Upper Extremity: WFL  Upper Extremity Strength:    -       Right Upper Extremity: 4/5  -       Left Upper Extremity: 4/5   Strength:    -       Right Upper Extremity: 4/5  -       Left Upper Extremity: 4/5  Fine Motor Coordination:    -       Intact  Gross motor coordination:   WFL    AMPAC 6 Click ADL:  AMPAC Total Score: 12    Treatment & Education:  OT oriented pt to time, place & situation as well as how to use call button and bed controls.  OT ed pt on OT role & POC as well as discharge recommendations.  OT ed pt on bed mobility techniques to increase independence with task.  OT educated pt and granddaughter on posterior hip precautions with instruction sheet and demonstration provided.  OT issued rubber squeeze ball to pt & educated pt  And granddaughter on hand resistive gross grasp and pinch HEP with instructions to  squeeze ball 25 times every two hours while awake. Pt performed 15 reps each exercise with cues for proper form.  OT ed pt on keeping HOB raised and sitting up in chair as pt will tolerate to counteract effects of bedrest.  OT ed patient on safety with walker use for functional mobility with cues for hand placement & sequencing.   OT ed pt on fall risk and strongly advised pt to call for help for all OOB mobility.  Pt verbalized understanding.      Education:    Patient left HOB elevated with all lines intact, call button in reach, bed alarm on and granddaughter present    GOALS:   Multidisciplinary Problems     Occupational Therapy Goals        Problem: Occupational Therapy Goal    Goal Priority Disciplines Outcome Interventions   Occupational Therapy Goal     OT, PT/OT Ongoing, Progressing    Description: Goals to be met by: 9/9/2020     Patient will increase functional independence with ADLs by performing:    Feeding with Set-up Assistance and Supervision.  Grooming while EOB with Set-up Assistance and Stand-by Assistance.  Sitting at edge of bed x10 minutes with Supervision.  Upper extremity exercise program x10 reps per handout, with assistance as needed.  Pt to adhere to posterior hip precautions during all ADL & functional mobility tasks with cues provided.                       History:     Past Medical History:   Diagnosis Date    Alzheimer disease     Cancer     prostate       Past Surgical History:   Procedure Laterality Date    HEMIARTHROPLASTY OF HIP Right 9/2/2020    Procedure: HEMIARTHROPLASTY, HIP, peg board, 1st assist;  Surgeon: Arthur Porter MD;  [facility]: [facility];  Service: Orthopedics;  Laterality: Right;       Time Tracking:     OT Date of Treatment: 09/03/20  OT Start Time: 1125  OT Stop Time: 1143   OT Start Time: 1448  OT Stop Time: 1505  OT Total Time (min): 35 min    Billable Minutes:Evaluation 10  Therapeutic Activity 15     Partial co-eval & treat with PT    Janice Cagle,  LOTR  9/3/2020

## 2020-09-03 NOTE — RESPIRATORY THERAPY
09/03/20 0755   Patient Assessment/Suction   Level of Consciousness (AVPU) alert   All Lung Fields Breath Sounds clear   PRE-TX-O2   O2 Device (Oxygen Therapy) room air   SpO2 98 %   Pulse Oximetry Type Intermittent   $ Pulse Oximetry - Multiple Charge Pulse Oximetry - Multiple   Pulse 82   Resp 16

## 2020-09-03 NOTE — CHAPLAIN
Spent about 10 min with pt's daughter, Serena, in the lobby since pt has already had a visitor today. Daughter upset that the DNR was revoked by her daughter (pt's grand). I offer compassionate presence and listening ear for Serena. As of this writing, I've not been to the floor.

## 2020-09-03 NOTE — PLAN OF CARE
POC reviewed with pt. Pt oriented to self and place. Weight shift assistance provided. Pt voids in brief w/o difficulty. 1L NC. Tele in use - SR with PVCs. PRN pain medication administered. Pt afebrile. PIV cdi. IVF and IV K administered. Call bell in reach, bed locked, bed alarm on, and fall band and non skid socks on. Will continue to monitor.

## 2020-09-03 NOTE — PT/OT/SLP EVAL
Physical Therapy Evaluation    Patient Name:  Kevyn Terrell Jr.   MRN:  [id number]    Recommendations:     Discharge Recommendations:  nursing facility, skilled(however, patient will likely D/C home with hospice)   Discharge Equipment Recommendations: walker, rolling, bedside commode(gait belt)   Barriers to discharge: None    Assessment:     Kevyn Terrell Jr. is a 89 y.o. male admitted with a medical diagnosis of Closed right hip fracture, initial encounter.  He presents with the following impairments/functional limitations:  weakness, impaired endurance, impaired self care skills, impaired functional mobilty, gait instability, impaired balance, impaired cognition, decreased lower extremity function, decreased safety awareness, decreased ROM, impaired skin, orthopedic precautions. Patient is agreeable to participation with PT evaluation. He is POD #1 right hip rosy-arthroplasty. He has a medical history of Alzheimers and metastatic cancer. He is oriented to person only. No family present. He does not remember falling at home or having surgery. R knee immobilizer in place. The patient does not have hip abduction pillow and demonstrates no evidence of learning when educated on posterior hip precautions. He is at high risk of not following posterior hip precautions and would benefit from a hip abduction pillow. RN aware. The patient requires MaxA x2 for supine to sit transfer. He requires MaxA of PT and Min-ModA from PT tech for sit to stand transfer. He ambulated 60' with Mod-MaxA with repeated VC for gait pattern and RW use with a chair to follow. MD and CM aware that patient would ideally benefit from SNF, but will need family training prior to D/C home with hospice.     Rehab Prognosis: Fair; patient would benefit from acute skilled PT services to address these deficits and reach maximum level of function.    Recent Surgery: Procedure(s) (LRB):  HEMIARTHROPLASTY, HIP, peg board, 1st assist (Right) 1 Day  "Post-Op    Plan:     During this hospitalization, patient to be seen BID to address the identified rehab impairments via gait training, therapeutic activities, therapeutic exercises and progress toward the following goals:    · Plan of Care Expires:  10/03/20    Subjective     Chief Complaint: "cover my feet with the blanket"  Patient/Family Comments/goals: "I want to get back in bed and take a nap"  Pain/Comfort:  · Pain Rating 1: (no pain verbalized)    Patients cultural, spiritual, Muslim conflicts given the current situation:      Living Environment:  Patient lives with granddaughter per chart review   Prior to admission, patients level of function was unknown. No family present to assist with history. Equipment used at home: hospital bed, oxygen, wheelchair (per chart review).  DME owned (not currently used): none.  Upon discharge, patient will have assistance from family.    Objective:     Communicated with MIGNON Oreilly prior to session.  Patient found HOB elevated with bed alarm, knee immobilizer, peripheral IV, telemetry(left SCD on LE, but not conected to box)  upon PT entry to room.    General Precautions: Standard, fall   Orthopedic Precautions:RLE weight bearing as tolerated, RLE posterior precautions   Braces: Knee immobilizer     Exams:  · Cognitive Exam:  Patient is oriented to Person  · RLE ROM: limited secondary to knee immobilizer  · LLE Strength: 3+/5    Functional Mobility:  · Bed Mobility:     · Supine to Sit: maximal assistance and of 2 persons  · Transfers:     · Sit to Stand:  maximal assistance of PT and Min-Mod of PT tech with rolling walker  · Gait: 60' RW, Mod-MaxA, VC for gait pattern and RW use  · Balance: Fair    Therapeutic Activities and Exercises:   Patient was educated on the importance of OOB activity and functional mobility to negate negative effects of prolonged bed rest during hospitalization, safe transfers and ambulation, posterior hip precautions, RLE WBAT, and D/C " planning -- no evidence of learning     AM-PAC 6 CLICK MOBILITY  Total Score:11     Patient left HOB elevated with all lines intact, call button in reach, bed alarm on, RN notified and avasys in room.    GOALS:   Multidisciplinary Problems     Physical Therapy Goals        Problem: Physical Therapy Goal    Goal Priority Disciplines Outcome Goal Variances Interventions   Physical Therapy Goal     PT, PT/OT      Description: Goals to be met by: 10/3/2020     Patient will increase functional independence with mobility by performin. Supine to sit with MInimal Assistance  2. Sit to stand transfer with Minimal Assistance  3. Bed to chair transfer with Minimal Assistance using Rolling Walker  4. Gait  x 200 feet with Minimal Assistance using Rolling Walker.                      History:     Past Medical History:   Diagnosis Date    Alzheimer disease     Cancer     prostate       History reviewed. No pertinent surgical history.    Time Tracking:     PT Received On: 20  PT Start Time: 947     PT Stop Time: 6  PT Total Time (min): 39 min     Billable Minutes: Evaluation 10 and Gait Training 29      Kamala French, PT  2020

## 2020-09-03 NOTE — PLAN OF CARE
Problem: Physical Therapy Goal  Goal: Physical Therapy Goal  Description: Goals to be met by: 10/3/2020     Patient will increase functional independence with mobility by performin. Supine to sit with MInimal Assistance  2. Sit to stand transfer with Minimal Assistance  3. Bed to chair transfer with Minimal Assistance using Rolling Walker  4. Gait  x 200 feet with Minimal Assistance using Rolling Walker.     Outcome: Ongoing, Progressing

## 2020-09-03 NOTE — PLAN OF CARE
Problem: Occupational Therapy Goal  Goal: Occupational Therapy Goal  Description: Goals to be met by: 9/9/2020     Patient will increase functional independence with ADLs by performing:    Feeding with Set-up Assistance and Supervision.  Grooming while EOB with Set-up Assistance and Stand-by Assistance.  Sitting at edge of bed x10 minutes with Supervision.  Upper extremity exercise program x10 reps per handout, with assistance as needed.  Pt to adhere to posterior hip precautions during all ADL & functional mobility tasks with cues provided.      Outcome: Ongoing, Progressing   OT evaluation completed today. Goals & care plan established.  ABRAHAM Olvera  9/3/2020

## 2020-09-03 NOTE — PLAN OF CARE
Received call from Emily, Supervisor of Volunteer Services that the pt's daughter, Nichelle Terrell is in the lobby upset that her daughter was able to revoke the DNR on the patient and she has POA.   I went to speak with Nichelle, she does not have a copy of the POA but is having one faxed to us. She just wants to make sure the pt is a DNR now that the surgery has been completed.   I spoke with the pt's grand-daughter, Maricruz who did verify that Nichelle has POA but the pt has been living with her for 9 years. I explained how a POA works and that it does not necessarily have to be the person caring for the patient. Maricruz also is in agreement with the pt being a DNR again since he has come out of surgery.   Dr Huber made aware of the above and the family's wishes to have the pt made a DNR once again....MIGNON Gutiérrez     09/03/20 0936   Discharge Reassessment   Assessment Type Discharge Planning Reassessment

## 2020-09-03 NOTE — PT/OT/SLP PROGRESS
Physical Therapy Treatment    Patient Name:  Kevyn Terrell Jr.   MRN:  [id number]    Recommendations:     Discharge Recommendations:  nursing facility, skilled(however, patient will likely D/C home with hospice)   Discharge Equipment Recommendations: (tub transfer bench)   Barriers to discharge: None    Assessment:     Kevyn Terrell Jr. is a 89 y.o. male admitted with a medical diagnosis of Closed right hip fracture, initial encounter.  He presents with the following impairments/functional limitations:  weakness, impaired endurance, impaired self care skills, impaired functional mobilty, gait instability, impaired balance, impaired cognition, decreased lower extremity function, decreased safety awareness, decreased ROM, impaired skin, orthopedic precautions. Patient is agreeable to second PT visit of the day. OT is also present to perform evaluation. The patient's granddaughter is present and is able to provide history. She reports the patient does already have a RW at home, but does not use an AD at baseline. He had only had 1 other fall previously. There are 4 stairs to enter and they are having a railing put up. She plans to carry him up the stairs upon discharge with assistance from family. He requires TotalA x2 for supine to sit transfer. For sit to stand transfer he requires MaxA from PT and Min-ModA from OT. This afternoon he only ambulated 20' compared to 60' this morning. He required ModA of PT and CGA-Delicia of OT and assistance with RW use. Discussed posterior hip precautions, RLE WBAT, safe transfers and ambulation with the patient's granddaughter with all questions answered. She states she will be here tomorrow after 11 for home safety to be reinforced. Patient still does not have hip abduction pillow.     Rehab Prognosis: Fair; patient would benefit from acute skilled PT services to address these deficits and reach maximum level of function.    Recent Surgery: Procedure(s) (LRB):  HEMIARTHROPLASTY,  HIP, peg board, 1st assist (Right) 1 Day Post-Op    Plan:     During this hospitalization, patient to be seen BID to address the identified rehab impairments via gait training, therapeutic activities, therapeutic exercises and progress toward the following goals:    · Plan of Care Expires:  10/03/20    Subjective     Chief Complaint: wants to take a nap  Patient/Family Comments/goals: take a nap  Pain/Comfort:  · Pain Rating 1: (no pain verbalized)  · Pain Addressed 1: Pre-medicate for activity      Objective:     Communicated with MIGNON Oreilly prior to session.  Patient found HOB elevated with bed alarm, peripheral IV, SCD, telemetry, knee immobilizer(avasys) upon PT entry to room.     General Precautions: Standard, fall   Orthopedic Precautions:RLE weight bearing as tolerated, RLE posterior precautions   Braces: Knee immobilizer     Functional Mobility:  · Bed Mobility:     · Supine to Sit: total assistance and of 2 persons  · Transfers:     · Sit to Stand:  maximal assistance of PT and Min-ModA of OT with rolling walker  · Gait: 20' RW, ModA from PT, CGA-Delicia of OT with increased cues for RW use, gait pattern, and upright posture  · Balance: Fair      AM-PAC 6 CLICK MOBILITY  Turning over in bed (including adjusting bedclothes, sheets and blankets)?: 2  Sitting down on and standing up from a chair with arms (e.g., wheelchair, bedside commode, etc.): 2  Moving from lying on back to sitting on the side of the bed?: 2  Moving to and from a bed to a chair (including a wheelchair)?: 2  Need to walk in hospital room?: 2  Climbing 3-5 steps with a railing?: 1  Basic Mobility Total Score: 11       Therapeutic Activities and Exercises:   Patient's granddaughter was educated on safe transfers and ambulation, D/C planning, posterior hip precautions, knee immobilizer use, safe carrying techniques to get patient up stairs, car transfers    Patient left HOB elevated with all lines intact, call button in reach, bed alarm on, RN  notified and granddaughter present..    GOALS:   Multidisciplinary Problems     Physical Therapy Goals        Problem: Physical Therapy Goal    Goal Priority Disciplines Outcome Goal Variances Interventions   Physical Therapy Goal     PT, PT/OT Ongoing, Progressing     Description: Goals to be met by: 10/3/2020     Patient will increase functional independence with mobility by performin. Supine to sit with MInimal Assistance  2. Sit to stand transfer with Minimal Assistance  3. Bed to chair transfer with Minimal Assistance using Rolling Walker  4. Gait  x 200 feet with Minimal Assistance using Rolling Walker.                      Time Tracking:     PT Received On: 20  PT Start Time: 1432     PT Stop Time: 1505  PT Total Time (min): 33 min     Billable Minutes: Gait Training 15    Treatment Type: Treatment  PT/PTA: PT     PTA Visit Number: 0     Kamala French, PT  2020

## 2020-09-04 LAB
ANION GAP SERPL CALC-SCNC: 7 MMOL/L (ref 8–16)
BASOPHILS # BLD AUTO: 0.04 K/UL (ref 0–0.2)
BASOPHILS NFR BLD: 0.5 % (ref 0–1.9)
BUN SERPL-MCNC: 8 MG/DL (ref 8–23)
CALCIUM SERPL-MCNC: 7.3 MG/DL (ref 8.7–10.5)
CHLORIDE SERPL-SCNC: 110 MMOL/L (ref 95–110)
CO2 SERPL-SCNC: 22 MMOL/L (ref 23–29)
CREAT SERPL-MCNC: 0.6 MG/DL (ref 0.5–1.4)
DIFFERENTIAL METHOD: ABNORMAL
EOSINOPHIL # BLD AUTO: 0.2 K/UL (ref 0–0.5)
EOSINOPHIL NFR BLD: 2.1 % (ref 0–8)
ERYTHROCYTE [DISTWIDTH] IN BLOOD BY AUTOMATED COUNT: 14.8 % (ref 11.5–14.5)
EST. GFR  (AFRICAN AMERICAN): >60 ML/MIN/1.73 M^2
EST. GFR  (NON AFRICAN AMERICAN): >60 ML/MIN/1.73 M^2
GLUCOSE SERPL-MCNC: 91 MG/DL (ref 70–110)
HCT VFR BLD AUTO: 26.6 % (ref 40–54)
HGB BLD-MCNC: 8.5 G/DL (ref 14–18)
IMM GRANULOCYTES # BLD AUTO: 0.04 K/UL (ref 0–0.04)
IMM GRANULOCYTES NFR BLD AUTO: 0.5 % (ref 0–0.5)
LYMPHOCYTES # BLD AUTO: 2.2 K/UL (ref 1–4.8)
LYMPHOCYTES NFR BLD: 27.4 % (ref 18–48)
MAGNESIUM SERPL-MCNC: 2.4 MG/DL (ref 1.6–2.6)
MCH RBC QN AUTO: 28.2 PG (ref 27–31)
MCHC RBC AUTO-ENTMCNC: 32 G/DL (ref 32–36)
MCV RBC AUTO: 88 FL (ref 82–98)
MONOCYTES # BLD AUTO: 1 K/UL (ref 0.3–1)
MONOCYTES NFR BLD: 11.9 % (ref 4–15)
NEUTROPHILS # BLD AUTO: 4.6 K/UL (ref 1.8–7.7)
NEUTROPHILS NFR BLD: 57.6 % (ref 38–73)
NRBC BLD-RTO: 0 /100 WBC
PHOSPHATE SERPL-MCNC: 1.1 MG/DL (ref 2.7–4.5)
PLATELET # BLD AUTO: 227 K/UL (ref 150–350)
PMV BLD AUTO: 9.9 FL (ref 9.2–12.9)
POTASSIUM SERPL-SCNC: 3.8 MMOL/L (ref 3.5–5.1)
RBC # BLD AUTO: 3.01 M/UL (ref 4.6–6.2)
SODIUM SERPL-SCNC: 139 MMOL/L (ref 136–145)
WBC # BLD AUTO: 7.98 K/UL (ref 3.9–12.7)

## 2020-09-04 PROCEDURE — 25000003 PHARM REV CODE 250: Performed by: INTERNAL MEDICINE

## 2020-09-04 PROCEDURE — 97116 GAIT TRAINING THERAPY: CPT

## 2020-09-04 PROCEDURE — 84100 ASSAY OF PHOSPHORUS: CPT

## 2020-09-04 PROCEDURE — 25000003 PHARM REV CODE 250: Performed by: HOSPITALIST

## 2020-09-04 PROCEDURE — 36415 COLL VENOUS BLD VENIPUNCTURE: CPT

## 2020-09-04 PROCEDURE — 85025 COMPLETE CBC W/AUTO DIFF WBC: CPT

## 2020-09-04 PROCEDURE — 63600175 PHARM REV CODE 636 W HCPCS: Performed by: ORTHOPAEDIC SURGERY

## 2020-09-04 PROCEDURE — 97530 THERAPEUTIC ACTIVITIES: CPT

## 2020-09-04 PROCEDURE — 83735 ASSAY OF MAGNESIUM: CPT

## 2020-09-04 PROCEDURE — 80048 BASIC METABOLIC PNL TOTAL CA: CPT

## 2020-09-04 PROCEDURE — 97110 THERAPEUTIC EXERCISES: CPT

## 2020-09-04 PROCEDURE — 94761 N-INVAS EAR/PLS OXIMETRY MLT: CPT

## 2020-09-04 PROCEDURE — 12000002 HC ACUTE/MED SURGE SEMI-PRIVATE ROOM

## 2020-09-04 RX ORDER — HYDROCODONE BITARTRATE AND ACETAMINOPHEN 5; 325 MG/1; MG/1
1 TABLET ORAL EVERY 6 HOURS PRN
Refills: 0
Start: 2020-09-04 | End: 2020-09-11 | Stop reason: SDUPTHER

## 2020-09-04 RX ORDER — POLYETHYLENE GLYCOL 3350 17 G/17G
17 POWDER, FOR SOLUTION ORAL 2 TIMES DAILY PRN
Refills: 0
Start: 2020-09-04

## 2020-09-04 RX ORDER — FAMOTIDINE 20 MG/1
20 TABLET, FILM COATED ORAL 2 TIMES DAILY
Status: DISCONTINUED | OUTPATIENT
Start: 2020-09-04 | End: 2020-09-11 | Stop reason: HOSPADM

## 2020-09-04 RX ORDER — QUETIAPINE FUMARATE 25 MG/1
25 TABLET, FILM COATED ORAL NIGHTLY
Status: DISCONTINUED | OUTPATIENT
Start: 2020-09-04 | End: 2020-09-11 | Stop reason: HOSPADM

## 2020-09-04 RX ORDER — ASPIRIN 325 MG
325 TABLET ORAL 2 TIMES DAILY
Qty: 60 TABLET | Refills: 0
Start: 2020-09-04 | End: 2021-09-04

## 2020-09-04 RX ORDER — DOCUSATE SODIUM 100 MG/1
100 CAPSULE, LIQUID FILLED ORAL 2 TIMES DAILY
Refills: 0
Start: 2020-09-04

## 2020-09-04 RX ADMIN — POLYETHYLENE GLYCOL 3350 17 G: 17 POWDER, FOR SOLUTION ORAL at 09:09

## 2020-09-04 RX ADMIN — DOCUSATE SODIUM 100 MG: 100 CAPSULE, LIQUID FILLED ORAL at 09:09

## 2020-09-04 RX ADMIN — DOCUSATE SODIUM 100 MG: 100 CAPSULE, LIQUID FILLED ORAL at 10:09

## 2020-09-04 RX ADMIN — POLYETHYLENE GLYCOL 3350 17 G: 17 POWDER, FOR SOLUTION ORAL at 10:09

## 2020-09-04 RX ADMIN — QUETIAPINE 25 MG: 25 TABLET ORAL at 09:09

## 2020-09-04 RX ADMIN — FAMOTIDINE 20 MG: 20 TABLET ORAL at 09:09

## 2020-09-04 RX ADMIN — MORPHINE SULFATE 4 MG: 4 INJECTION INTRAVENOUS at 11:09

## 2020-09-04 NOTE — PT/OT/SLP PROGRESS
Physical Therapy Treatment    Patient Name:  Kevyn Terrell Jr.   MRN:  [id number]    Recommendations:     Discharge Recommendations:  nursing facility, skilled   Discharge Equipment Recommendations: (tub transfer bench)   Barriers to discharge: Decreased caregiver support    Assessment:     Kevyn Terrell Jr. is a 89 y.o. male admitted with a medical diagnosis of Closed right hip fracture, initial encounter.  He presents with the following impairments/functional limitations:  weakness, impaired endurance, impaired functional mobilty, impaired self care skills, gait instability, impaired balance, decreased lower extremity function, pain, impaired cardiopulmonary response to activity, impaired joint extensibility, impaired muscle length, orthopedic precautions; pt presents with confusion but oriented to self and his family; had to be reminded several times for Post hip precautions, and was redirected sev times since pt is more focused on the pain and his fatigue; pt is very motivated tgo go home, so PT was redirecting the pt and make him realized to work harder on the premise of the goal to go home.    Rehab Prognosis: Good; patient would benefit from acute skilled PT services to address these deficits and reach maximum level of function.    Recent Surgery: Procedure(s) (LRB):  HEMIARTHROPLASTY, HIP, peg board, 1st assist (Right) 2 Days Post-Op    Plan:     During this hospitalization, patient to be seen BID to address the identified rehab impairments via gait training, therapeutic activities, therapeutic exercises and progress toward the following goals:    · Plan of Care Expires:  10/03/20    Subjective     Chief Complaint: pleasantly confused, very protective of his (R)LE  Patient/Family Comments/goals: wants to go home ASAP, granddaughter Sasha states pt might go home instead of SNF  Pain/Comfort:  · Pain Rating 1: 8/10  · Location - Side 1: Right  · Location - Orientation 1: generalized  · Location 1:  hip  · Pain Addressed 1: Nurse notified, Distraction, Reposition  · Pain Rating Post-Intervention 1: 6/10  Objective:     Communicated with MIGNON Oreilly prior to session.  Patient found HOB elevated with peripheral IV upon PT entry to room.     General Precautions: Standard, fall   Orthopedic Precautions:RLE weight bearing as tolerated, RLE posterior precautions   Braces: Knee immobilizer     Functional Mobility:  · Bed Mobility:     · Rolling Left:  minimum assistance  · Rolling Right: minimum assistance  · Supine to Sit: moderate assistance  · Sit to Supine: moderate assistance  · Transfers:     · Sit to Stand:  moderate assistance with rolling walker  · Gait: 50 ft with mod A x 2, took a seated rest break x 3 min; then amb x 70 ft RW mod A x 2 with a W/C following the pt for safety  · Balance: Sitting: G/F; Standing: F/P      AM-PAC 6 CLICK MOBILITY  Turning over in bed (including adjusting bedclothes, sheets and blankets)?: 3  Sitting down on and standing up from a chair with arms (e.g., wheelchair, bedside commode, etc.): 2  Moving from lying on back to sitting on the side of the bed?: 2  Moving to and from a bed to a chair (including a wheelchair)?: 2  Need to walk in hospital room?: 2  Climbing 3-5 steps with a railing?: 1  Basic Mobility Total Score: 12       Therapeutic Activities and Exercises:   Functional mobility training as above; pt and Maricruz educated on (R) LE ex and will plan to give Maricruz the copy of ex next visit; also reeducated them on safety precautions and mobility techniques.    Patient left HOB elevated with all lines intact and call button in reach..    GOALS:   Multidisciplinary Problems     Physical Therapy Goals        Problem: Physical Therapy Goal    Goal Priority Disciplines Outcome Goal Variances Interventions   Physical Therapy Goal     PT, PT/OT Ongoing, Progressing     Description: Goals to be met by: 10/3/2020     Patient will increase functional independence with mobility by  performin. Supine to sit with MInimal Assistance  2. Sit to stand transfer with Minimal Assistance  3. Bed to chair transfer with Minimal Assistance using Rolling Walker  4. Gait  x 200 feet with Minimal Assistance using Rolling Walker.                    Time Tracking:     PT Received On: 20  PT Start Time: 1205     PT Stop Time: 1245  PT Total Time (min): 40 min     Billable Minutes: Gait Training 12, Therapeutic Activity 15 and Therapeutic Exercise 11    Treatment Type: Treatment  PT/PTA: PT     PTA Visit Number: 0     Dayan Fernando, PT  2020

## 2020-09-04 NOTE — PLAN OF CARE
A&Ox1 to self only with confusion. Able to make simple needs known. Pt educated to call for assistance. Plan of care discussed with patient. No evidence of learning noted. No s/s of pain noted at this time. Pt turned/wt shifted every 2 hours. Comfort level maintained. Telesitter at bedside. Drsg to (R) hip clean, dry and intact. Immobilizer in place to RLE. Pt tolerating well. Pt remains free from fall/injury throughout shift. Pt has removed telemetry monitor several times. Charge nurse MIGNON Rhoades aware. Non-skid socks in place when pt out of bed. Bed in lowest position, wheels locked, side rails up x2, call light within reach, bed alarm set and sounding. No distress noted at this time. Will Continue to observe.

## 2020-09-04 NOTE — PLAN OF CARE
09/03/20 1918   Patient Assessment/Suction   Level of Consciousness (AVPU) alert   Respiratory Effort Unlabored   PRE-TX-O2   O2 Device (Oxygen Therapy) room air   SpO2 (!) 94 %   Pulse Oximetry Type Intermittent   $ Pulse Oximetry - Multiple Charge Pulse Oximetry - Multiple   Pulse 88   Resp 18

## 2020-09-04 NOTE — PT/OT/SLP PROGRESS
Physical Therapy Treatment    Patient Name:  Kevyn Terrell Jr.   MRN:  [id number]    Recommendations:     Discharge Recommendations:  nursing facility, skilled   Discharge Equipment Recommendations: other (see comments)(TBD at the next level of care)   Barriers to discharge: Decreased caregiver support    Assessment:     Kevyn Terrell Jr. is a 89 y.o. male admitted with a medical diagnosis of Closed right hip fracture, initial encounter.  He presents with the following impairments/functional limitations:  weakness, impaired endurance, impaired functional mobilty, impaired self care skills, gait instability, impaired balance, decreased lower extremity function, pain, impaired cardiopulmonary response to activity, impaired joint extensibility, impaired muscle length, orthopedic precautions;  Pt is now able to walk more than this morning; Maricruz, granddaughter was very helpful during Tx and was encouraging pt several times; pt needed to be encouraged when ambulating as he was constantly c/o pain to (L) hip; pt was redirected to continue walking and he tends to enjoy talking about his trips in the past in [place] and [place]; progressing well with Tx; pt will continue to benefit from skilled acute PT services to improve current level of functional mob and improve overall capacity to perform activities.    Rehab Prognosis: Good; patient would benefit from acute skilled PT services to address these deficits and reach maximum level of function.    Recent Surgery: Procedure(s) (LRB):  HEMIARTHROPLASTY, HIP, peg board, 1st assist (Right) 2 Days Post-Op    Plan:     During this hospitalization, patient to be seen BID to address the identified rehab impairments via gait training, therapeutic activities, therapeutic exercises and progress toward the following goals:    · Plan of Care Expires:  10/03/20    Subjective     Chief Complaint: pain to (L) hip   Patient/Family Comments/goals: Maricruz wanted his grandfather to walk as  much as tolerated  Pain/Comfort:  · Pain Rating 1: 7/10  · Location - Side 1: Right  · Location - Orientation 1: generalized  · Location 1: hip  · Pain Addressed 1: Reposition, Nurse notified, Distraction  · Pain Rating Post-Intervention 1: 6/10    Objective:     Communicated with MIGNON Oreilly prior to session.  Patient found HOB elevated with knee immobilizer, peripheral IV upon PT entry to room.     General Precautions: Standard, fall   Orthopedic Precautions:RLE posterior precautions, RLE weight bearing as tolerated   Braces: Knee immobilizer     Functional Mobility:  · Bed Mobility:     · Rolling Left:  contact guard assistance  · Rolling Right: contact guard assistance  · Supine to Sit: moderate assistance  · Sit to Supine: moderate assistance  · Transfers:     · Sit to Stand:  moderate assistance with rolling walker  · Gait: 76 ft with a RW, mod A x 2, WBAT (R)LE, with a W/C for safety; sat in W/C x 5 min and was attempted to walk again, and after sev encouragements, pt agreed but only walked x 4 ft then requested to sit and did not want to walk despite several advice to walk by PT and granddaughter    AM-PAC 6 CLICK MOBILITY  Turning over in bed (including adjusting bedclothes, sheets and blankets)?: 3  Sitting down on and standing up from a chair with arms (e.g., wheelchair, bedside commode, etc.): 2  Moving from lying on back to sitting on the side of the bed?: 2  Moving to and from a bed to a chair (including a wheelchair)?: 2  Need to walk in hospital room?: 2  Climbing 3-5 steps with a railing?: 1  Basic Mobility Total Score: 12     Therapeutic Activities and Exercises:  Granddaughter was given a copy of the HEP, return demo is satisfactory; Functional mobility training as above; pt  and family re-educated on safety precautions and mobility techniques.    Patient left HOB elevated with all lines intact, call button in reach, bed alarm on, RN notified and Maricruz present..    GOALS:   Multidisciplinary  Problems     Physical Therapy Goals        Problem: Physical Therapy Goal    Goal Priority Disciplines Outcome Goal Variances Interventions   Physical Therapy Goal     PT, PT/OT Ongoing, Progressing     Description: Goals to be met by: 10/3/2020     Patient will increase functional independence with mobility by performin. Supine to sit with MInimal Assistance  2. Sit to stand transfer with Minimal Assistance  3. Bed to chair transfer with Minimal Assistance using Rolling Walker  4. Gait  x 200 feet with Minimal Assistance using Rolling Walker.                    Time Tracking:     PT Received On: 20  PT Start Time: 1555     PT Stop Time: 1629  PT Total Time (min): 34 min     Billable Minutes: Gait Training 13 and Therapeutic Activity 15    Treatment Type: Treatment  PT/PTA: PT     PTA Visit Number: 0     Dayan Fernando, PT  2020

## 2020-09-04 NOTE — PLAN OF CARE
SW sent AVS to [facility] via [employer] [facility] system.       09/04/20 1050   Post-Acute Status   Post-Acute Authorization Hospice   Hospice Status Referrals Sent   Patient choice form signed by patient/caregiver List with quality metrics by geographic area provided

## 2020-09-04 NOTE — PLAN OF CARE
Gordy spoke with daughter Nichelle about the grand-daughter's decision for pt to go to SNF.  She's in agreement with her daughter. Whichever place she want her to go she's fine with it, since she's been taking care of her grandfather, per Nichelle.  GORDY updated KAMRYN Castro and she is aware of the grand-daughter's selection for SNF.       09/04/20 0902   Discharge Reassessment   Assessment Type Discharge Planning Reassessment   Provided patient/caregiver education on the expected discharge date and the discharge plan Yes

## 2020-09-04 NOTE — PROGRESS NOTES
[facility] Hospital Medicine  Progress Note    Patient Name: Kevyn Terrell Jr.  MRN: [id number]  Patient Class: IP- Inpatient   Admission Date: 8/30/2020  Length of Stay: 4 days  Attending Physician: Virginie Faith MD  Primary Care Provider: Carole Rangel MD        Subjective:     Principal Problem:Closed right hip fracture, initial encounter        HPI:  Kevyn Terrell Jr. is a 89 y.o. male with a PMHx of Alzheimer's disease and metastatic cancer who presented to the ED via EMS with right hip pain following a fall earlier today.  Pt's granddaughter states that she had gone to the grocery store and when she returned, she found pt on the ground.  He had apparently fallen down 2 stairs at his trailer.  Pt is pleasantly confused and does not remember the incident.  History is obtained from the granddaughter.  Pt was recently medicated for pain and has no complaints at this time.        Overview/Hospital Course:  No notes on file    Interval History:  Patient seen and examined.  Remains pleasantly confused. Scheduled to go home with hospice.    Review of Systems   Unable to perform ROS: Dementia     Objective:     Vital Signs (Most Recent):  Temp: 96.9 °F (36.1 °C) (09/04/20 1248)  Pulse: 100 (09/04/20 1248)  Resp: 20 (09/04/20 1248)  BP: 130/61 (09/04/20 1248)  SpO2: (!) 93 % (09/04/20 1248) Vital Signs (24h Range):  Temp:  [96.2 °F (35.7 °C)-97.9 °F (36.6 °C)] 96.9 °F (36.1 °C)  Pulse:  [] 100  Resp:  [18-20] 20  SpO2:  [92 %-96 %] 93 %  BP: (112-145)/(53-67) 130/61     Weight: 59.9 kg (132 lb)  Body mass index is 20.07 kg/m².  No intake or output data in the 24 hours ending 09/04/20 1601   Physical Exam  Vitals signs and nursing note reviewed.   Constitutional:       General: He is not in acute distress.     Appearance: He is well-developed.   HENT:      Head: Normocephalic and atraumatic.      Mouth/Throat:      Mouth: Mucous membranes are moist.   Eyes:      Conjunctiva/sclera:  Conjunctivae normal.      Pupils: Pupils are equal, round, and reactive to light.   Neck:      Musculoskeletal: Normal range of motion and neck supple.   Cardiovascular:      Rate and Rhythm: Normal rate and regular rhythm.      Heart sounds: Normal heart sounds.   Pulmonary:      Effort: Pulmonary effort is normal.      Breath sounds: Normal breath sounds.   Abdominal:      General: Bowel sounds are normal.      Palpations: Abdomen is soft.   Musculoskeletal:         General: Tenderness (right hip) present.      Comments: Surgical bandage clean/dry/intact   Skin:     General: Skin is warm and dry.   Neurological:      Mental Status: He is alert. He is confused.      Comments: Oriented to person.     Psychiatric:         Mood and Affect: Mood normal.         Behavior: Behavior normal.         Significant Labs: All pertinent labs within the past 24 hours have been reviewed.    Significant Imaging: I have reviewed and interpreted all pertinent imaging results/findings within the past 24 hours.      Assessment/Plan:      * Closed right hip fracture, initial encounter  Right hip x-ray reviewed  Status post right hip MRI arthroplasty with Dr. Porter  PT OT following.   for dc planning- current plan is home with re-initiation of hospice tomorrow  Pain control        Hypophosphatemia  On replacement protocol.      Alzheimer's dementia without behavioral disturbance  Chronic.  Fall and delirium precautions.  1) During the day, please open the shades and turn on the lights- If patient is in private room, please make sure they are close to the window.    2) Make sure the correct date and time is written on the whiteboard, as well as the current care team  3) Minimize interruptions at night-time, close shades and turn off TV.   4) When possible, during daytime make sure patient is in chair and provide activities that engage patient.  5) Please make sure patient has hearing aids and glasses while  awake.        Prostate cancer metastatic to bone  Followed by Hematology/Oncology at [facility].      Hypokalemia  Patient has hypokalemia which is currently controlled. Last electrolytes reviewed-   Recent Labs   Lab 09/03/20  0444 09/04/20  0538   K 4.0 3.8   . Will replace potassium and monitor electrolytes closely. Continuous telemetry.    Hypocalcemia  Replacement ordered.        VTE Risk Mitigation (From admission, onward)         Ordered     Place sequential compression device  Until discontinued      09/02/20 1613     Place DONAL hose  Until discontinued      09/02/20 1613     IP VTE HIGH RISK PATIENT  Once      09/02/20 1613     Place sequential compression device  Until discontinued      08/31/20 0212             Discussed with  and .  Patient's granddaughter has requested skilled nursing facility now and instead of going home with hospice care.    Discharge Planning   BREANNE: 9/5/2020     Code Status: DNR   Is the patient medically ready for discharge?:     Reason for patient still in hospital (select all that apply): Patient trending condition  Discharge Plan A: SNF            Virginie Faith MD  Department of Hospital Medicine   [facility]

## 2020-09-04 NOTE — PLAN OF CARE
KAMRYN sent patient information to [facility] Johnson, You Levi, Charlene Mckeon, Prasanth, and Palak via [facility] system.       09/04/20 8951   Post-Acute Status   Post-Acute Authorization Placement   Post-Acute Placement Status Referrals Sent   Patient choice form signed by patient/caregiver List with quality metrics by geographic area provided

## 2020-09-04 NOTE — PLAN OF CARE
Tj spoke with daughter Nichelle this am about going back with hospice vs SNF.  Nichelle said, My father will go back on Hospice at discharge.  Her daughter will be at the house and they have the equipment.  KAMRYN Castro will contact the hospice agency this am.       09/04/20 0930   Discharge Reassessment   Assessment Type Discharge Planning Reassessment   Provided patient/caregiver education on the expected discharge date and the discharge plan Yes

## 2020-09-04 NOTE — RESPIRATORY THERAPY
09/04/20 0826   PRE-TX-O2   O2 Device (Oxygen Therapy) room air   SpO2 95 %   Pulse Oximetry Type Intermittent   $ Pulse Oximetry - Multiple Charge Pulse Oximetry - Multiple

## 2020-09-04 NOTE — SUBJECTIVE & OBJECTIVE
Interval History:  Patient seen and examined.  Remains pleasantly confused. Scheduled to go home with hospice.    Review of Systems   Unable to perform ROS: Dementia     Objective:     Vital Signs (Most Recent):  Temp: 96.9 °F (36.1 °C) (09/04/20 1248)  Pulse: 100 (09/04/20 1248)  Resp: 20 (09/04/20 1248)  BP: 130/61 (09/04/20 1248)  SpO2: (!) 93 % (09/04/20 1248) Vital Signs (24h Range):  Temp:  [96.2 °F (35.7 °C)-97.9 °F (36.6 °C)] 96.9 °F (36.1 °C)  Pulse:  [] 100  Resp:  [18-20] 20  SpO2:  [92 %-96 %] 93 %  BP: (112-145)/(53-67) 130/61     Weight: 59.9 kg (132 lb)  Body mass index is 20.07 kg/m².  No intake or output data in the 24 hours ending 09/04/20 1601   Physical Exam  Vitals signs and nursing note reviewed.   Constitutional:       General: He is not in acute distress.     Appearance: He is well-developed.   HENT:      Head: Normocephalic and atraumatic.      Mouth/Throat:      Mouth: Mucous membranes are moist.   Eyes:      Conjunctiva/sclera: Conjunctivae normal.      Pupils: Pupils are equal, round, and reactive to light.   Neck:      Musculoskeletal: Normal range of motion and neck supple.   Cardiovascular:      Rate and Rhythm: Normal rate and regular rhythm.      Heart sounds: Normal heart sounds.   Pulmonary:      Effort: Pulmonary effort is normal.      Breath sounds: Normal breath sounds.   Abdominal:      General: Bowel sounds are normal.      Palpations: Abdomen is soft.   Musculoskeletal:         General: Tenderness (right hip) present.      Comments: Surgical bandage clean/dry/intact   Skin:     General: Skin is warm and dry.   Neurological:      Mental Status: He is alert. He is confused.      Comments: Oriented to person.     Psychiatric:         Mood and Affect: Mood normal.         Behavior: Behavior normal.         Significant Labs: All pertinent labs within the past 24 hours have been reviewed.    Significant Imaging: I have reviewed and interpreted all pertinent imaging  results/findings within the past 24 hours.

## 2020-09-04 NOTE — PLAN OF CARE
Problem: Physical Therapy Goal  Goal: Physical Therapy Goal  Description: Goals to be met by: 10/3/2020     Patient will increase functional independence with mobility by performin. Supine to sit with MInimal Assistance  2. Sit to stand transfer with Minimal Assistance  3. Bed to chair transfer with Minimal Assistance using Rolling Walker  4. Gait  x 200 feet with Minimal Assistance using Rolling Walker.     Outcome: Ongoing, Progressing   PT for functional mob training and/ or ex; pt and granddaughter Sasha re-educated on safety precautions

## 2020-09-05 PROCEDURE — 97116 GAIT TRAINING THERAPY: CPT | Mod: CQ

## 2020-09-05 PROCEDURE — 97530 THERAPEUTIC ACTIVITIES: CPT | Mod: CQ

## 2020-09-05 PROCEDURE — 94761 N-INVAS EAR/PLS OXIMETRY MLT: CPT

## 2020-09-05 PROCEDURE — 63600175 PHARM REV CODE 636 W HCPCS: Performed by: ORTHOPAEDIC SURGERY

## 2020-09-05 PROCEDURE — 12000002 HC ACUTE/MED SURGE SEMI-PRIVATE ROOM

## 2020-09-05 PROCEDURE — 25000003 PHARM REV CODE 250: Performed by: HOSPITALIST

## 2020-09-05 PROCEDURE — 25000003 PHARM REV CODE 250: Performed by: INTERNAL MEDICINE

## 2020-09-05 PROCEDURE — 25000003 PHARM REV CODE 250: Performed by: ORTHOPAEDIC SURGERY

## 2020-09-05 RX ADMIN — DOCUSATE SODIUM 100 MG: 100 CAPSULE, LIQUID FILLED ORAL at 08:09

## 2020-09-05 RX ADMIN — MORPHINE SULFATE 4 MG: 4 INJECTION INTRAVENOUS at 01:09

## 2020-09-05 RX ADMIN — Medication 6 MG: at 08:09

## 2020-09-05 RX ADMIN — QUETIAPINE 25 MG: 25 TABLET ORAL at 08:09

## 2020-09-05 RX ADMIN — FAMOTIDINE 20 MG: 20 TABLET ORAL at 08:09

## 2020-09-05 NOTE — SUBJECTIVE & OBJECTIVE
Interval History:  Patient seen and examined.  Remains pleasantly confused. Grand-daughter is at bedside.     Review of Systems   Unable to perform ROS: Dementia     Objective:     Vital Signs (Most Recent):  Temp: 96 °F (35.6 °C) (09/05/20 1218)  Pulse: 74 (09/05/20 1218)  Resp: 18 (09/05/20 1339)  BP: (!) 143/79 (09/05/20 1218)  SpO2: 96 % (09/05/20 1218) Vital Signs (24h Range):  Temp:  [96 °F (35.6 °C)-99 °F (37.2 °C)] 96 °F (35.6 °C)  Pulse:  [] 74  Resp:  [18-20] 18  SpO2:  [92 %-96 %] 96 %  BP: (127-168)/(62-79) 143/79     Weight: 59.9 kg (132 lb)  Body mass index is 20.07 kg/m².    Intake/Output Summary (Last 24 hours) at 9/5/2020 1541  Last data filed at 9/5/2020 1200  Gross per 24 hour   Intake 720 ml   Output --   Net 720 ml      Physical Exam  Vitals signs and nursing note reviewed.   Constitutional:       General: He is not in acute distress.     Appearance: He is well-developed.   HENT:      Head: Normocephalic and atraumatic.      Mouth/Throat:      Mouth: Mucous membranes are moist.   Eyes:      Conjunctiva/sclera: Conjunctivae normal.      Pupils: Pupils are equal, round, and reactive to light.   Neck:      Musculoskeletal: Normal range of motion and neck supple.   Cardiovascular:      Rate and Rhythm: Normal rate and regular rhythm.      Heart sounds: Normal heart sounds.   Pulmonary:      Effort: Pulmonary effort is normal.      Breath sounds: Normal breath sounds.   Abdominal:      General: Bowel sounds are normal.      Palpations: Abdomen is soft.   Musculoskeletal:         General: Tenderness (right hip) present.      Comments: Surgical bandage clean/dry/intact   Skin:     General: Skin is warm and dry.   Neurological:      Mental Status: He is alert. He is confused.      Comments: Oriented to person.     Psychiatric:         Mood and Affect: Mood normal.         Behavior: Behavior normal.         Significant Labs: All pertinent labs within the past 24 hours have been  reviewed.    Significant Imaging: I have reviewed and interpreted all pertinent imaging results/findings within the past 24 hours.

## 2020-09-05 NOTE — PLAN OF CARE
Increased gait distance to 100' x 2 with rw and min A of 2 people with constant vc for direction, increased R LE clearance, and improved posture. One seated rest between walks for approx 3 minutes. Granddaughter active participant. Education provided to granddaughter re: proper position for standing with pt, using gait belt, and reviewed R posterior hip TRINY precautions and ways to help avoid those motions in transfers.

## 2020-09-05 NOTE — CARE UPDATE
09/05/20 0732   Patient Assessment/Suction   Level of Consciousness (AVPU) alert   PRE-TX-O2   O2 Device (Oxygen Therapy) room air   SpO2 96 %   Pulse Oximetry Type Intermittent   $ Pulse Oximetry - Multiple Charge Pulse Oximetry - Multiple   Pulse 103   Resp 18

## 2020-09-05 NOTE — PLAN OF CARE
A&Ox1 to self only with confusion. Able to make simple needs known. Pt educated to call for assistance. Plan of care discussed with patient. No evidence of learning noted. Patient c/o pain when being turned.  Pt turned/wt shifted every 2 hours. Telesitter at bedside. Drsg to (R) hip clean, dry and intact. Immobilizer in place to RLE. Pt tolerating well. Pt remains free from fall/injury throughout shift. Pt has removed telemetry monitor several times. Charge nurse MIGNON Rhoades aware. Non-skid socks in place.  Patient has had multiple loose runny stools, very odorous.   Bed in lowest position, wheels locked, side rails up x2, call light within reach, bed alarm set and sounding. No distress noted at this time. Will Continue to observe

## 2020-09-05 NOTE — PT/OT/SLP PROGRESS
"Physical Therapy Treatment    Patient Name:  Kevyn Terrell Jr.   MRN:  [id number]    Recommendations:     Discharge Recommendations:  nursing facility, skilled   Discharge Equipment Recommendations: other (see comments)(TBD at the next level of care)       Assessment:     Kevyn Terrell Jr. is a 89 y.o. male admitted with a medical diagnosis of Closed right hip fracture, initial encounter.  He presents with the following impairments/functional limitations: Increased gait distance to 100' x 2 with rw and min A of 2 people with constant vc for direction, increased R LE clearance, and improved posture. One seated rest between walks for approx 3 minutes. Granddaughter active participant. Education provided to granddaughter re: proper position for standing with pt, using gait belt, and reviewed R posterior hip TRINY precautions and ways to help avoid those motions in transfers.     Rehab Prognosis: patient would benefit from acute skilled PT services to address these deficits and reach maximum level of function.    Recent Surgery: Procedure(s) (LRB):  HEMIARTHROPLASTY, HIP, peg board, 1st assist (Right) 3 Days Post-Op    Plan:     During this hospitalization, patient to be seen BID to address the identified rehab impairments via gait training, therapeutic activities, therapeutic exercises and progress toward the following goals:    · Plan of Care Expires:  10/03/20    Subjective     Chief Complaint: no complaints. Once walking in hallway, pt asks PTA to "set me free", as pt is eager to walk.   Patient/Family Comments/goals: get better and go home.   Pain/Comfort:  · Pain Rating 1: 0/10  · Pain Rating Post-Intervention 1: 0/10      Objective:     Communicated with MIGNON Oreilly prior to session.  Patient found up in chair with peripheral IV, knee immobilizer upon PT entry to room.     General Precautions: Standard, fall   Orthopedic Precautions:RLE weight bearing as tolerated, RLE posterior precautions   Braces: Knee " immobilizer     Supine-sit with HOB elevated to 45* and mod to max A of 2 people and A for R LE mgmt.   Static sitting balance EOB CGA with VC for improved posture  Gait training 100' x 2 with rw and min A of 2 people with constant vc for direction, increased R LE clearance, and improved posture. One seated rest between walks for approx 3 minutes. Granddaughter active participant. Education provided to granddaughter re: proper position for standing with pt, using gait belt, and reviewed R posterior hip TRINY precautions and ways to help avoid those motions in transfers.     Patient left up in chair with all lines intact, call button in reach, MIGNON Oreilly notified, granddaughter present and Discussed with MIGNON Oreilly that there is no power cord for Posey chair alarm. Cleared to leave pt sitting up in BSC with granddaughter present. Instructed granddaughter to remain with pt at all times and to not leave pt for any reason, and to have nurse with pt if she needs to leave pt, even to go to bathroom. She agreed. MIGNON Oreilly stated he will secure a cord for the alarm. Meanwhile, pt does have avasys system in place for his safety. ..    GOALS:   Multidisciplinary Problems     Physical Therapy Goals        Problem: Physical Therapy Goal    Goal Priority Disciplines Outcome Goal Variances Interventions   Physical Therapy Goal     PT, PT/OT Ongoing, Progressing     Description: Goals to be met by: 10/3/2020     Patient will increase functional independence with mobility by performin. Supine to sit with MInimal Assistance  2. Sit to stand transfer with Minimal Assistance  3. Bed to chair transfer with Minimal Assistance using Rolling Walker  4. Gait  x 200 feet with Minimal Assistance using Rolling Walker.                      Time Tracking:     PT Received On: 20  PT Start Time: 1141     PT Stop Time: 1208  PT Total Time (min): 27 min     Billable Minutes: Gait Training 14 and Therapeutic Activity 14    Treatment  Type: Treatment  PT/PTA: PTA     PTA Visit Number: 1     Elisabet Reyes, ANTELMO  09/05/2020

## 2020-09-05 NOTE — RESPIRATORY THERAPY
09/04/20 2033   Patient Assessment/Suction   Level of Consciousness (AVPU) alert   Respiratory Effort Unlabored   PRE-TX-O2   O2 Device (Oxygen Therapy) room air   SpO2 (!) 92 %   Pulse Oximetry Type Intermittent   $ Pulse Oximetry - Multiple Charge Pulse Oximetry - Multiple

## 2020-09-05 NOTE — PROGRESS NOTES
[facility] Hospital Medicine  Progress Note    Patient Name: Kevyn Terrell Jr.  MRN: [id number]  Patient Class: IP- Inpatient   Admission Date: 8/30/2020  Length of Stay: 5 days  Attending Physician: Virginie Faith MD  Primary Care Provider: Carole Rangel MD        Subjective:     Principal Problem:Closed right hip fracture, initial encounter        HPI:  Kevyn Terrell Jr. is a 89 y.o. male with a PMHx of Alzheimer's disease and metastatic cancer who presented to the ED via EMS with right hip pain following a fall earlier today.  Pt's granddaughter states that she had gone to the grocery store and when she returned, she found pt on the ground.  He had apparently fallen down 2 stairs at his trailer.  Pt is pleasantly confused and does not remember the incident.  History is obtained from the granddaughter.  Pt was recently medicated for pain and has no complaints at this time.        Overview/Hospital Course:  No notes on file    Interval History:  Patient seen and examined.  Remains pleasantly confused. Grand-daughter is at bedside.     Review of Systems   Unable to perform ROS: Dementia     Objective:     Vital Signs (Most Recent):  Temp: 96 °F (35.6 °C) (09/05/20 1218)  Pulse: 74 (09/05/20 1218)  Resp: 18 (09/05/20 1339)  BP: (!) 143/79 (09/05/20 1218)  SpO2: 96 % (09/05/20 1218) Vital Signs (24h Range):  Temp:  [96 °F (35.6 °C)-99 °F (37.2 °C)] 96 °F (35.6 °C)  Pulse:  [] 74  Resp:  [18-20] 18  SpO2:  [92 %-96 %] 96 %  BP: (127-168)/(62-79) 143/79     Weight: 59.9 kg (132 lb)  Body mass index is 20.07 kg/m².    Intake/Output Summary (Last 24 hours) at 9/5/2020 1541  Last data filed at 9/5/2020 1200  Gross per 24 hour   Intake 720 ml   Output --   Net 720 ml      Physical Exam  Vitals signs and nursing note reviewed.   Constitutional:       General: He is not in acute distress.     Appearance: He is well-developed.   HENT:      Head: Normocephalic and atraumatic.      Mouth/Throat:       Mouth: Mucous membranes are moist.   Eyes:      Conjunctiva/sclera: Conjunctivae normal.      Pupils: Pupils are equal, round, and reactive to light.   Neck:      Musculoskeletal: Normal range of motion and neck supple.   Cardiovascular:      Rate and Rhythm: Normal rate and regular rhythm.      Heart sounds: Normal heart sounds.   Pulmonary:      Effort: Pulmonary effort is normal.      Breath sounds: Normal breath sounds.   Abdominal:      General: Bowel sounds are normal.      Palpations: Abdomen is soft.   Musculoskeletal:         General: Tenderness (right hip) present.      Comments: Surgical bandage clean/dry/intact   Skin:     General: Skin is warm and dry.   Neurological:      Mental Status: He is alert. He is confused.      Comments: Oriented to person.     Psychiatric:         Mood and Affect: Mood normal.         Behavior: Behavior normal.         Significant Labs: All pertinent labs within the past 24 hours have been reviewed.    Significant Imaging: I have reviewed and interpreted all pertinent imaging results/findings within the past 24 hours.      Assessment/Plan:      * Closed right hip fracture, initial encounter  Right hip x-ray reviewed  Status post right hip MRI arthroplasty with Dr. Porter  PT OT following.   for dc planning- current plan is home with re-initiation of hospice tomorrow  Pain control        Hypophosphatemia  On replacement protocol.      Alzheimer's dementia without behavioral disturbance  Chronic.  Fall and delirium precautions.  1) During the day, please open the shades and turn on the lights- If patient is in private room, please make sure they are close to the window.    2) Make sure the correct date and time is written on the whiteboard, as well as the current care team  3) Minimize interruptions at night-time, close shades and turn off TV.   4) When possible, during daytime make sure patient is in chair and provide activities that engage patient.  5) Please  make sure patient has hearing aids and glasses while awake.        Prostate cancer metastatic to bone  Followed by Hematology/Oncology at [facility].      Hypokalemia  Patient has hypokalemia which is currently controlled. Last electrolytes reviewed-   Recent Labs   Lab 09/03/20  0444 09/04/20  0538   K 4.0 3.8   . Will replace potassium and monitor electrolytes closely. Continuous telemetry.    Hypocalcemia  Replacement ordered.        VTE Risk Mitigation (From admission, onward)         Ordered     Place sequential compression device  Until discontinued      09/02/20 1613     Place DONAL hose  Until discontinued      09/02/20 1613     IP VTE HIGH RISK PATIENT  Once      09/02/20 1613     Place sequential compression device  Until discontinued      08/31/20 0212                Discharge Planning   BREANNE: 9/8/2020     Code Status: DNR   Is the patient medically ready for discharge?:     Reason for patient still in hospital (select all that apply): Patient trending condition  Discharge Plan A: SNF                 Virginie Faith MD  Department of Hospital Medicine   [facility]

## 2020-09-06 PROCEDURE — 25000003 PHARM REV CODE 250: Performed by: INTERNAL MEDICINE

## 2020-09-06 PROCEDURE — 97110 THERAPEUTIC EXERCISES: CPT

## 2020-09-06 PROCEDURE — 97116 GAIT TRAINING THERAPY: CPT

## 2020-09-06 PROCEDURE — 25000003 PHARM REV CODE 250: Performed by: ORTHOPAEDIC SURGERY

## 2020-09-06 PROCEDURE — 94761 N-INVAS EAR/PLS OXIMETRY MLT: CPT

## 2020-09-06 PROCEDURE — 12000002 HC ACUTE/MED SURGE SEMI-PRIVATE ROOM

## 2020-09-06 PROCEDURE — 63600175 PHARM REV CODE 636 W HCPCS: Performed by: ORTHOPAEDIC SURGERY

## 2020-09-06 RX ADMIN — Medication 6 MG: at 08:09

## 2020-09-06 RX ADMIN — QUETIAPINE 25 MG: 25 TABLET ORAL at 08:09

## 2020-09-06 RX ADMIN — FAMOTIDINE 20 MG: 20 TABLET ORAL at 08:09

## 2020-09-06 RX ADMIN — FAMOTIDINE 20 MG: 20 TABLET ORAL at 10:09

## 2020-09-06 RX ADMIN — MORPHINE SULFATE 4 MG: 4 INJECTION INTRAVENOUS at 01:09

## 2020-09-06 NOTE — PT/OT/SLP PROGRESS
Physical Therapy Treatment    Patient Name:  Kevyn Terrell Jr.   MRN:  [id number]    Recommendations:     Discharge Recommendations:  nursing facility, skilled   Discharge Equipment Recommendations: other (see comments)(TBD at the next level of care)   Barriers to discharge: None    Assessment:     Kevyn Terrell Jr. is a 89 y.o. male admitted with a medical diagnosis of Closed right hip fracture, initial encounter.  He presents with the following impairments/functional limitations:  weakness, impaired endurance, impaired self care skills, impaired functional mobilty, gait instability, impaired balance, impaired cognition, decreased lower extremity function, decreased safety awareness, pain, decreased ROM, impaired skin, edema, orthopedic precautions. Patient agreeable to gait training and strength training.    Rehab Prognosis: Fair; patient would benefit from acute skilled PT services to address these deficits and reach maximum level of function.    Recent Surgery: Procedure(s) (LRB):  HEMIARTHROPLASTY, HIP, peg board, 1st assist (Right) 4 Days Post-Op    Plan:     During this hospitalization, patient to be seen BID to address the identified rehab impairments via gait training, therapeutic activities, therapeutic exercises and progress toward the following goals:    · Plan of Care Expires:  10/03/20    Subjective     Chief Complaint: generalized weakness  Patient/Family Comments/goals: improve overall strength/mobility  Pain/Comfort:  · Pain Rating 1: other (see comments)(c/o pain but unable to provide accurate description)  · Pain Rating 2: 0/10      Objective:     Communicated with nurse prior to session.  Patient found supine with bed alarm, peripheral IV, telemetry upon PT entry to room.     General Precautions: Standard, fall   Orthopedic Precautions:RLE weight bearing as tolerated, RLE posterior precautions   Braces: Knee immobilizer(in bed)     Functional Mobility:  · Bed Mobility:     · Supine to Sit:  moderate assistance  · Sit to Supine: minimum assistance and moderate assistance  · Transfers:     · Sit to Stand:  minimum assistance and moderate assistance with rolling walker  · Gait: 150 ft w/ RW and min assist      AM-PAC 6 CLICK MOBILITY  Turning over in bed (including adjusting bedclothes, sheets and blankets)?: 2  Sitting down on and standing up from a chair with arms (e.g., wheelchair, bedside commode, etc.): 2  Moving from lying on back to sitting on the side of the bed?: 2  Moving to and from a bed to a chair (including a wheelchair)?: 2  Need to walk in hospital room?: 3       Therapeutic Activities and Exercises:   x 15 supine rt LE there ex = HS, hip ABD/ADD, AP, SLR    Patient left supine with all lines intact, call button in reach and bed alarm on..    GOALS:   Multidisciplinary Problems     Physical Therapy Goals        Problem: Physical Therapy Goal    Goal Priority Disciplines Outcome Goal Variances Interventions   Physical Therapy Goal     PT, PT/OT Ongoing, Progressing     Description: Goals to be met by: 10/3/2020     Patient will increase functional independence with mobility by performin. Supine to sit with MInimal Assistance  2. Sit to stand transfer with Minimal Assistance  3. Bed to chair transfer with Minimal Assistance using Rolling Walker  4. Gait  x 200 feet with Minimal Assistance using Rolling Walker.                      Time Tracking:     PT Received On: 20  PT Start Time: 0900     PT Stop Time: 940  PT Total Time (min): 40 min     Billable Minutes: Gait Training 30 and Therapeutic Exercise 10    Treatment Type: Treatment  PT/PTA: PT     PTA Visit Number: 0     Diego Bonds, PT  2020

## 2020-09-06 NOTE — PROGRESS NOTES
[facility] Hospital Medicine  Progress Note    Patient Name: Kevyn Terrell Jr.  MRN: [id number]  Patient Class: IP- Inpatient   Admission Date: 8/30/2020  Length of Stay: 6 days  Attending Physician: Virginie Faith MD  Primary Care Provider: Carole Rangel MD        Subjective:     Principal Problem:Closed right hip fracture, initial encounter        HPI:  Kevyn Terrell Jr. is a 89 y.o. male with a PMHx of Alzheimer's disease and metastatic cancer who presented to the ED via EMS with right hip pain following a fall earlier today.  Pt's granddaughter states that she had gone to the grocery store and when she returned, she found pt on the ground.  He had apparently fallen down 2 stairs at his trailer.  Pt is pleasantly confused and does not remember the incident.  History is obtained from the granddaughter.  Pt was recently medicated for pain and has no complaints at this time.        Overview/Hospital Course:  No notes on file    Interval History:  Patient seen and examined.  Remains pleasantly confused. Denies any subjective complaints.    Review of Systems   Unable to perform ROS: Dementia     Objective:     Vital Signs (Most Recent):  Temp: 97.6 °F (36.4 °C) (09/06/20 0356)  Pulse: 106 (09/06/20 0356)  Resp: 18 (09/06/20 0356)  BP: 135/84 (09/06/20 0356)  SpO2: 95 % (09/06/20 0356) Vital Signs (24h Range):  Temp:  [96 °F (35.6 °C)-98.4 °F (36.9 °C)] 97.6 °F (36.4 °C)  Pulse:  [] 106  Resp:  [16-18] 18  SpO2:  [94 %-96 %] 95 %  BP: (135-160)/(67-84) 135/84     Weight: 56.5 kg (124 lb 8 oz)  Body mass index is 18.93 kg/m².    Intake/Output Summary (Last 24 hours) at 9/6/2020 0939  Last data filed at 9/5/2020 1200  Gross per 24 hour   Intake 240 ml   Output --   Net 240 ml      Physical Exam  Vitals signs and nursing note reviewed.   Constitutional:       General: He is not in acute distress.     Appearance: He is well-developed.   HENT:      Head: Normocephalic and atraumatic.       Mouth/Throat:      Mouth: Mucous membranes are moist.   Eyes:      Conjunctiva/sclera: Conjunctivae normal.      Pupils: Pupils are equal, round, and reactive to light.   Neck:      Musculoskeletal: Normal range of motion and neck supple.   Cardiovascular:      Rate and Rhythm: Normal rate and regular rhythm.      Heart sounds: Normal heart sounds.   Pulmonary:      Effort: Pulmonary effort is normal.      Breath sounds: Normal breath sounds.   Abdominal:      General: Bowel sounds are normal.      Palpations: Abdomen is soft.   Musculoskeletal:         General: Tenderness (right hip) present.      Comments: Surgical bandage clean/dry/intact   Skin:     General: Skin is warm and dry.   Neurological:      Mental Status: He is alert. He is confused.      Comments: Oriented to person.     Psychiatric:         Mood and Affect: Mood normal.         Behavior: Behavior normal.         Significant Labs: All pertinent labs within the past 24 hours have been reviewed.    Significant Imaging: I have reviewed and interpreted all pertinent imaging results/findings within the past 24 hours.      Assessment/Plan:      * Closed right hip fracture, initial encounter  Right hip x-ray reviewed  Status post right hip MRI arthroplasty with Dr. Porter  PT OT following.   for dc planning- current plan is home with re-initiation of hospice tomorrow  Pain control        Hypophosphatemia  On replacement protocol.      Alzheimer's dementia without behavioral disturbance  Chronic.  Fall and delirium precautions.  1) During the day, please open the shades and turn on the lights- If patient is in private room, please make sure they are close to the window.    2) Make sure the correct date and time is written on the whiteboard, as well as the current care team  3) Minimize interruptions at night-time, close shades and turn off TV.   4) When possible, during daytime make sure patient is in chair and provide activities that engage  patient.  5) Please make sure patient has hearing aids and glasses while awake.        Prostate cancer metastatic to bone  Followed by Hematology/Oncology at [facility].      Hypokalemia  Patient has hypokalemia which is currently controlled. Last electrolytes reviewed-   Recent Labs   Lab 09/03/20  0444 09/04/20  0538   K 4.0 3.8   . Will replace potassium and monitor electrolytes closely. Continuous telemetry.    Hypocalcemia  Replacement ordered.        VTE Risk Mitigation (From admission, onward)         Ordered     Place sequential compression device  Until discontinued      09/02/20 1613     Place DONAL hose  Until discontinued      09/02/20 1613     IP VTE HIGH RISK PATIENT  Once      09/02/20 1613     Place sequential compression device  Until discontinued      08/31/20 0212                Discharge Planning   BREANNE: 9/8/2020     Code Status: DNR   Is the patient medically ready for discharge?:     Reason for patient still in hospital (select all that apply): Other (specify) awaiting SNF placement  Discharge Plan A: SNF                 Virginie Fatih MD  Department of Hospital Medicine   [facility]

## 2020-09-06 NOTE — PLAN OF CARE
Problem: Fall Injury Risk  Goal: Absence of Fall and Fall-Related Injury  Outcome: Ongoing, Progressing  Intervention: Identify and Manage Contributors to Fall Injury Risk  Flowsheets (Taken 9/5/2020 2157)  Self-Care Promotion:   independence encouraged   BADL personal objects within reach  Medication Review/Management: medications reviewed  Intervention: Promote Injury-Free Environment  Flowsheets (Taken 9/5/2020 2157)  Safety Promotion/Fall Prevention:   assistive device/personal item within reach   bed alarm set  Environmental Safety Modification: assistive device/personal items within reach     Problem: Skin Injury Risk Increased  Goal: Skin Health and Integrity  Outcome: Ongoing, Progressing  Intervention: Optimize Skin Protection  Flowsheets (Taken 9/5/2020 2157)  Pressure Reduction Techniques: weight shift assistance provided  Pressure Reduction Devices: pressure-redistributing mattress utilized  Skin Protection:   adhesive use limited   tubing/devices free from skin contact     Problem: Infection  Goal: Infection Symptom Resolution  Outcome: Ongoing, Progressing  Intervention: Prevent or Manage Infection  Flowsheets (Taken 9/5/2020 2157)  Fever Reduction/Comfort Measures: lightweight bedding  Infection Management: aseptic technique maintained     Problem: Bleeding (Hip Arthroplasty)  Goal: Absence of Bleeding  Outcome: Ongoing, Progressing  Intervention: Monitor and Manage Bleeding  Flowsheets (Taken 9/5/2020 2157)  Bleeding Management: dressing monitored     Problem: Bowel Elimination Impaired (Hip Arthroplasty)  Goal: Effective Bowel Elimination  Outcome: Ongoing, Progressing  Intervention: Promote Effective Bowel Elimination  Flowsheets (Taken 9/5/2020 2157)  Bowel Dysfunction Management: relaxation techniques promoted  Bowel Elimination Promotion: adequate fluid intake promoted     Problem: Pain (Hip Arthroplasty)  Goal: Acceptable Pain Control  Outcome: Ongoing, Progressing  Intervention: Prevent or  Manage Pain  Flowsheets (Taken 9/5/2020 2157)  Pain Management Interventions: pain management plan reviewed with patient/caregiver     Problem: Wound  Goal: Optimal Wound Healing  Outcome: Ongoing, Progressing  Intervention: Promote Effective Wound Healing  Flowsheets (Taken 9/5/2020 2157)  Sleep/Rest Enhancement: regular sleep/rest pattern promoted  Pain Management Interventions: pain management plan reviewed with patient/caregiver

## 2020-09-06 NOTE — NURSING
Patient alert, pleasantly confused, calm and cooperative with care. Telesitter DCd, as patient did not attempt to get out of bed overnight. Miralax held, colace given. Multiple loose BMs overnight. Incontinent of bowel and bladder. Tele monitoring, PVCs noted throughout shift. Immobilizer in place. Dressing to Right hip intact.

## 2020-09-06 NOTE — RESPIRATORY THERAPY
09/05/20 1934   Patient Assessment/Suction   Level of Consciousness (AVPU) alert   Respiratory Effort Normal   PRE-TX-O2   O2 Device (Oxygen Therapy) room air   SpO2 96 %   Pulse Oximetry Type Intermittent   $ Pulse Oximetry - Multiple Charge Pulse Oximetry - Multiple   Pulse 94

## 2020-09-06 NOTE — CARE UPDATE
09/06/20 1140   PRE-TX-O2   O2 Device (Oxygen Therapy) room air   SpO2 95 %   Pulse Oximetry Type Intermittent   $ Pulse Oximetry - Multiple Charge Pulse Oximetry - Multiple   Pulse 106   Resp 18

## 2020-09-06 NOTE — SUBJECTIVE & OBJECTIVE
Interval History:  Patient seen and examined.  Remains pleasantly confused. Grand-daughter is at bedside.     Review of Systems   Unable to perform ROS: Dementia     Objective:     Vital Signs (Most Recent):  Temp: 97.6 °F (36.4 °C) (09/06/20 0356)  Pulse: 106 (09/06/20 0356)  Resp: 18 (09/06/20 0356)  BP: 135/84 (09/06/20 0356)  SpO2: 95 % (09/06/20 0356) Vital Signs (24h Range):  Temp:  [96 °F (35.6 °C)-98.4 °F (36.9 °C)] 97.6 °F (36.4 °C)  Pulse:  [] 106  Resp:  [16-18] 18  SpO2:  [94 %-96 %] 95 %  BP: (135-160)/(67-84) 135/84     Weight: 56.5 kg (124 lb 8 oz)  Body mass index is 18.93 kg/m².    Intake/Output Summary (Last 24 hours) at 9/6/2020 0939  Last data filed at 9/5/2020 1200  Gross per 24 hour   Intake 240 ml   Output --   Net 240 ml      Physical Exam  Vitals signs and nursing note reviewed.   Constitutional:       General: He is not in acute distress.     Appearance: He is well-developed.   HENT:      Head: Normocephalic and atraumatic.      Mouth/Throat:      Mouth: Mucous membranes are moist.   Eyes:      Conjunctiva/sclera: Conjunctivae normal.      Pupils: Pupils are equal, round, and reactive to light.   Neck:      Musculoskeletal: Normal range of motion and neck supple.   Cardiovascular:      Rate and Rhythm: Normal rate and regular rhythm.      Heart sounds: Normal heart sounds.   Pulmonary:      Effort: Pulmonary effort is normal.      Breath sounds: Normal breath sounds.   Abdominal:      General: Bowel sounds are normal.      Palpations: Abdomen is soft.   Musculoskeletal:         General: Tenderness (right hip) present.      Comments: Surgical bandage clean/dry/intact   Skin:     General: Skin is warm and dry.   Neurological:      Mental Status: He is alert. He is confused.      Comments: Oriented to person.     Psychiatric:         Mood and Affect: Mood normal.         Behavior: Behavior normal.         Significant Labs: All pertinent labs within the past 24 hours have been  reviewed.    Significant Imaging: I have reviewed and interpreted all pertinent imaging results/findings within the past 24 hours.

## 2020-09-07 PROCEDURE — 94761 N-INVAS EAR/PLS OXIMETRY MLT: CPT

## 2020-09-07 PROCEDURE — 25000003 PHARM REV CODE 250: Performed by: ORTHOPAEDIC SURGERY

## 2020-09-07 PROCEDURE — 97116 GAIT TRAINING THERAPY: CPT | Mod: CQ

## 2020-09-07 PROCEDURE — 97530 THERAPEUTIC ACTIVITIES: CPT | Mod: CO

## 2020-09-07 PROCEDURE — 25000003 PHARM REV CODE 250: Performed by: INTERNAL MEDICINE

## 2020-09-07 PROCEDURE — 97530 THERAPEUTIC ACTIVITIES: CPT | Mod: CQ

## 2020-09-07 PROCEDURE — 12000002 HC ACUTE/MED SURGE SEMI-PRIVATE ROOM

## 2020-09-07 RX ADMIN — QUETIAPINE 25 MG: 25 TABLET ORAL at 08:09

## 2020-09-07 RX ADMIN — FAMOTIDINE 20 MG: 20 TABLET ORAL at 08:09

## 2020-09-07 RX ADMIN — Medication 6 MG: at 08:09

## 2020-09-07 NOTE — NURSING
Patient alert, pleasantly confused, calm and cooperative with care. Miralax held and colace held, as multiple loose BMs previous night. Incontinent of bowel and bladder. Tele monitoring, PVCs and periods of trigeminy noted throughout shift. Immobilizer in place. Dressing to Right hip intact. Discharge pending SNF placement.

## 2020-09-07 NOTE — PT/OT/SLP PROGRESS
Physical Therapy Treatment    Patient Name:  Kevyn Terrell Jr.   MRN:  [id number]    Recommendations:     Discharge Recommendations:  nursing facility, skilled   Discharge Equipment Recommendations: other (see comments)(TBD at the next level of care)   Barriers to discharge:     Assessment:     Kevyn Terrell Jr. is a 89 y.o. male admitted with a medical diagnosis of Closed right hip fracture, initial encounter.  He presents with the following impairments/functional limitations:    .    Rehab Prognosis: Fair; patient would benefit from acute skilled PT services to address these deficits and reach maximum level of function.    Recent Surgery: Procedure(s) (LRB):  HEMIARTHROPLASTY, HIP, peg board, 1st assist (Right) 5 Days Post-Op    Plan:     During this hospitalization, patient to be seen BID to address the identified rehab impairments via gait training, therapeutic activities, therapeutic exercises and progress toward the following goals:    · Plan of Care Expires:  10/03/20    Subjective     Chief Complaint: none  Patient/Family Comments/goals: Patient refusing therapy, returned later with patient agreeable, and then refused ambulation once sitting eob.  Pain/Comfort:  · Pain Rating 1: 0/10      Objective:     Communicated with nurse prior to session.  Patient found supine with bed alarm, knee immobilizer, SCD, telemetry upon PT entry to room.     General Precautions: Standard, fall   Orthopedic Precautions:RLE weight bearing as tolerated, RLE posterior precautions   Braces:       Functional Mobility:  · Bed Mobility:     · Rolling Right: moderate assistance  · Scooting: minimum assistance  · Supine to Sit: moderate assistance  · Sit to Supine: moderate assistance  · Transfers:     · Sit to Stand:  moderate assistance with rolling walker (attempted x3, patient refused at this point, unable to encourage)      AM-PAC 6 CLICK MOBILITY          Therapeutic Activities and Exercises:       Patient left supine with  all lines intact, call button in reach, bed alarm on and nurse notified..    GOALS:   Multidisciplinary Problems     Physical Therapy Goals        Problem: Physical Therapy Goal    Goal Priority Disciplines Outcome Goal Variances Interventions   Physical Therapy Goal     PT, PT/OT Ongoing, Progressing     Description: Goals to be met by: 10/3/2020     Patient will increase functional independence with mobility by performin. Supine to sit with MInimal Assistance  2. Sit to stand transfer with Minimal Assistance  3. Bed to chair transfer with Minimal Assistance using Rolling Walker  4. Gait  x 200 feet with Minimal Assistance using Rolling Walker.                      Time Tracking:     PT Received On: 20  PT Start Time: 920     PT Stop Time: 930  PT Total Time (min): 10 min     Billable Minutes: Therapeutic Activity 10    Treatment Type: Treatment  PT/PTA: PTA     PTA Visit Number: 1     Janice Mcintosh PTA  2020

## 2020-09-07 NOTE — PT/OT/SLP PROGRESS
Occupational Therapy   Treatment    Name: Kevyn Terrell Jr.  MRN: [id number]  Admitting Diagnosis:  Closed right hip fracture, initial encounter  5 Days Post-Op    Recommendations:     Discharge Recommendations: nursing facility, skilled  Discharge Equipment Recommendations:  bath bench  Barriers to discharge:  Decreased caregiver support(pt has full time caregiver with granddaughter but she cannot care for him in this condition)    Assessment:     Kevyn Terrell Jr. is a 89 y.o. male with a medical diagnosis of Closed right hip fracture, initial encounter.  He presents with pleasant and dementia, granddaughter/caregiver present to encourage participation, cold and bed seeking. Performance deficits affecting function are weakness, impaired endurance, impaired self care skills, impaired functional mobilty, impaired balance, impaired cognition, decreased coordination, decreased upper extremity function, decreased lower extremity function, decreased safety awareness, decreased ROM, impaired skin, orthopedic precautions.     Rehab Prognosis:  Fair; patient would benefit from acute skilled OT services to address these deficits and reach maximum level of function.       Plan:     Patient to be seen 3 x/week to address the above listed problems via self-care/home management, therapeutic activities, therapeutic exercises  · Plan of Care Expires: 09/09/20  · Plan of Care Reviewed with: patient, caregiver, grandchild(le)    Subjective     Pain/Comfort:  · Pain Rating 1: 0/10    Objective:     Communicated with: RNSilvia prior to session.  Patient found HOB elevated with bed alarm, knee immobilizer, SCD, telemetry upon OT entry to room.    General Precautions: Standard, fall   Orthopedic Precautions:RLE weight bearing as tolerated, RLE posterior precautions   Braces: Knee immobilizer     Occupational Performance:     Bed Mobility:    · Patient completed Scooting/Bridging with moderate assistance and with draw sheet and  RLE lift  · Patient completed Supine to Sit with maximal assistance and with draw sheet and RLE lift  · Patient completed Sit to Supine with minimum assistance with RLE lift     Activities of Daily Living:  Pt declines at this time.    [facility] [address] 6 Click ADL: 13    Treatment & Education:  -EOB x 8 mins for balance challenge BUE AROM; pt request 5 times to lay back down during this activity. Granddaughter and WINSTON providing max encouragement.   -10 reps of shoulder shrugs, Rey abd, crossing midline and horizontal abd, tricep extensions with Gh flexion.    Patient left HOB elevated with call button in reach, bed alarm on and granddaughterMaricruz presentEducation:      GOALS:   Multidisciplinary Problems     Occupational Therapy Goals        Problem: Occupational Therapy Goal    Goal Priority Disciplines Outcome Interventions   Occupational Therapy Goal     OT, PT/OT Ongoing, Progressing    Description: Goals to be met by: 9/9/2020     Patient will increase functional independence with ADLs by performing:    Feeding with Set-up Assistance and Supervision.  Grooming while EOB with Set-up Assistance and Stand-by Assistance.  Sitting at edge of bed x10 minutes with Supervision.  Upper extremity exercise program x10 reps per handout, with assistance as needed.  Pt to adhere to posterior hip precautions during all ADL & functional mobility tasks with cues provided.                       Time Tracking:     OT Date of Treatment: 09/07/20  OT Start Time: 1502  OT Stop Time: 1516  OT Total Time (min): 14 min    Billable Minutes:Therapeutic Activity 14 min    NINI Montgomery  9/7/2020

## 2020-09-07 NOTE — PLAN OF CARE
Problem: Fall Injury Risk  Goal: Absence of Fall and Fall-Related Injury  Outcome: Ongoing, Progressing  Intervention: Identify and Manage Contributors to Fall Injury Risk  Flowsheets (Taken 9/6/2020 2131)  Self-Care Promotion:   independence encouraged   BADL personal objects within reach  Medication Review/Management: medications reviewed  Intervention: Promote Injury-Free Environment  Flowsheets (Taken 9/6/2020 2131)  Safety Promotion/Fall Prevention:   assistive device/personal item within reach   bed alarm set  Environmental Safety Modification: assistive device/personal items within reach     Problem: Skin Injury Risk Increased  Goal: Skin Health and Integrity  Outcome: Ongoing, Progressing  Intervention: Optimize Skin Protection  Flowsheets (Taken 9/6/2020 2131)  Pressure Reduction Techniques:   frequent weight shift encouraged   weight shift assistance provided  Pressure Reduction Devices: pressure-redistributing mattress utilized  Skin Protection:   adhesive use limited   tubing/devices free from skin contact  Head of Bed (HOB): HOB elevated     Problem: Infection  Goal: Infection Symptom Resolution  Outcome: Ongoing, Progressing  Intervention: Prevent or Manage Infection  Flowsheets (Taken 9/6/2020 2131)  Fever Reduction/Comfort Measures: lightweight bedding  Infection Management: aseptic technique maintained     Problem: Pain (Hip Arthroplasty)  Goal: Acceptable Pain Control  Outcome: Ongoing, Progressing  Intervention: Prevent or Manage Pain  Flowsheets (Taken 9/6/2020 2131)  Pain Management Interventions:   care clustered   quiet environment facilitated     Problem: Wound  Goal: Optimal Wound Healing  Outcome: Ongoing, Progressing  Intervention: Promote Effective Wound Healing  Flowsheets (Taken 9/6/2020 2131)  Pain Management Interventions:   care clustered   quiet environment facilitated

## 2020-09-07 NOTE — PT/OT/SLP PROGRESS
Physical Therapy Treatment    Patient Name:  Kevyn Terrell Jr.   MRN:  [id number]    Recommendations:     Discharge Recommendations:  nursing facility, skilled   Discharge Equipment Recommendations: other (see comments)(TBD at the next level of care)   Barriers to discharge:     Assessment:     Kevyn Terrell Jr. is a 89 y.o. male admitted with a medical diagnosis of Closed right hip fracture, initial encounter.  He presents with the following impairments/functional limitations:  weakness, impaired endurance, impaired self care skills, impaired functional mobilty, gait instability, decreased lower extremity function, decreased safety awareness, impaired cardiopulmonary response to activity .    Rehab Prognosis: Fair; patient would benefit from acute skilled PT services to address these deficits and reach maximum level of function.    Recent Surgery: Procedure(s) (LRB):  HEMIARTHROPLASTY, HIP, peg board, 1st assist (Right) 5 Days Post-Op    Plan:     During this hospitalization, patient to be seen BID to address the identified rehab impairments via gait training, therapeutic activities, therapeutic exercises and progress toward the following goals:    · Plan of Care Expires:  10/03/20    Subjective     Chief Complaint: Not wanting to get oob  Patient/Family Comments/goals: Granddaughter present and encouraging participation.  Pain/Comfort:  · Pain Rating 1: 0/10      Objective:     Communicated with nurse prior to session.  Patient found supine with bed alarm, knee immobilizer, SCD, telemetry upon PT entry to room.     General Precautions: Standard, fall   Orthopedic Precautions:RLE weight bearing as tolerated, RLE posterior precautions   Braces: Knee immobilizer     Functional Mobility:  · Bed Mobility:     · Rolling Left:  moderate assistance  · Supine to Sit: moderate assistance and of 1 persons  · Transfers:     · Sit to Stand:  moderate assistance with rolling walker  · Gait: ' with assist for RW  forward progression.       AM-PAC 6 CLICK MOBILITY          Therapeutic Activities and Exercises:       Patient left up in chair with all lines intact, call button in reach, chair alarm on and granddaughter present..    GOALS:   Multidisciplinary Problems     Physical Therapy Goals        Problem: Physical Therapy Goal    Goal Priority Disciplines Outcome Goal Variances Interventions   Physical Therapy Goal     PT, PT/OT Ongoing, Progressing     Description: Goals to be met by: 10/3/2020     Patient will increase functional independence with mobility by performin. Supine to sit with MInimal Assistance  2. Sit to stand transfer with Minimal Assistance  3. Bed to chair transfer with Minimal Assistance using Rolling Walker  4. Gait  x 200 feet with Minimal Assistance using Rolling Walker.                      Time Tracking:     PT Received On: 20  PT Start Time: 1115     PT Stop Time: 1130  PT Total Time (min): 15 min     Billable Minutes: Gait Training 15    Treatment Type: Treatment  PT/PTA: PTA     PTA Visit Number: 1     Janice Mcintosh, ANTELMO  2020

## 2020-09-07 NOTE — RESPIRATORY THERAPY
09/07/20 0701   Patient Assessment/Suction   Level of Consciousness (AVPU) alert   Expansion/Accessory Muscles/Retractions no use of accessory muscles;no retractions;expansion symmetric   All Lung Fields Breath Sounds clear;diminished;equal bilaterally   Rhythm/Pattern, Respiratory chest vibration;unlabored;pattern regular;depth regular   Cough Frequency no cough   PRE-TX-O2   O2 Device (Oxygen Therapy) room air   SpO2 95 %   Pulse Oximetry Type Intermittent   $ Pulse Oximetry - Multiple Charge Pulse Oximetry - Multiple   Pulse 97   Resp 18   Positioning HOB elevated 30 degrees

## 2020-09-07 NOTE — SUBJECTIVE & OBJECTIVE
Interval History:  Patient seen and examined.  Remains pleasantly confused.  No subjective complaints.    Review of Systems   Unable to perform ROS: Dementia     Objective:     Vital Signs (Most Recent):  Temp: 98.7 °F (37.1 °C) (09/07/20 0758)  Pulse: 96 (09/07/20 0758)  Resp: 16 (09/07/20 0758)  BP: (!) 168/83 (09/07/20 0758)  SpO2: (!) 94 % (09/07/20 0758) Vital Signs (24h Range):  Temp:  [96.4 °F (35.8 °C)-98.7 °F (37.1 °C)] 98.7 °F (37.1 °C)  Pulse:  [] 96  Resp:  [16-18] 16  SpO2:  [94 %-98 %] 94 %  BP: (122-168)/(71-92) 168/83     Weight: 56.7 kg (124 lb 14.4 oz)  Body mass index is 18.99 kg/m².    Intake/Output Summary (Last 24 hours) at 9/7/2020 1124  Last data filed at 9/6/2020 1800  Gross per 24 hour   Intake 360 ml   Output --   Net 360 ml      Physical Exam  Vitals signs and nursing note reviewed.   Constitutional:       General: He is not in acute distress.     Appearance: He is well-developed.   HENT:      Head: Normocephalic and atraumatic.      Mouth/Throat:      Mouth: Mucous membranes are moist.   Eyes:      Conjunctiva/sclera: Conjunctivae normal.      Pupils: Pupils are equal, round, and reactive to light.   Neck:      Musculoskeletal: Normal range of motion and neck supple.   Cardiovascular:      Rate and Rhythm: Normal rate and regular rhythm.      Heart sounds: Normal heart sounds.   Pulmonary:      Effort: Pulmonary effort is normal.      Breath sounds: Normal breath sounds.   Abdominal:      General: Bowel sounds are normal.      Palpations: Abdomen is soft.   Musculoskeletal:         General: Tenderness (right hip) present.      Comments: Surgical bandage clean/dry/intact   Skin:     General: Skin is warm and dry.   Neurological:      Mental Status: He is alert. He is confused.      Comments: Oriented to person.     Psychiatric:         Mood and Affect: Mood normal.         Behavior: Behavior normal.         Significant Labs: All pertinent labs within the past 24 hours have been  reviewed.    Significant Imaging: I have reviewed and interpreted all pertinent imaging results/findings within the past 24 hours.

## 2020-09-07 NOTE — RESPIRATORY THERAPY
09/06/20 1938   Patient Assessment/Suction   Level of Consciousness (AVPU) alert   Respiratory Effort Unlabored   PRE-TX-O2   O2 Device (Oxygen Therapy) room air   SpO2 98 %   Pulse Oximetry Type Intermittent   $ Pulse Oximetry - Multiple Charge Pulse Oximetry - Multiple

## 2020-09-07 NOTE — PROGRESS NOTES
[facility] Hospital Medicine  Progress Note    Patient Name: Kevyn Terrell Jr.  MRN: [id number]  Patient Class: IP- Inpatient   Admission Date: 8/30/2020  Length of Stay: 7 days  Attending Physician: Virginie Faith MD  Primary Care Provider: Carole Rangel MD        Subjective:     Principal Problem:Closed right hip fracture, initial encounter        HPI:  Kevyn Terrell Jr. is a 89 y.o. male with a PMHx of Alzheimer's disease and metastatic cancer who presented to the ED via EMS with right hip pain following a fall earlier today.  Pt's granddaughter states that she had gone to the grocery store and when she returned, she found pt on the ground.  He had apparently fallen down 2 stairs at his trailer.  Pt is pleasantly confused and does not remember the incident.  History is obtained from the granddaughter.  Pt was recently medicated for pain and has no complaints at this time.        Overview/Hospital Course:  No notes on file    Interval History:  Patient seen and examined.  Remains pleasantly confused.  No subjective complaints.    Review of Systems   Unable to perform ROS: Dementia     Objective:     Vital Signs (Most Recent):  Temp: 98.7 °F (37.1 °C) (09/07/20 0758)  Pulse: 96 (09/07/20 0758)  Resp: 16 (09/07/20 0758)  BP: (!) 168/83 (09/07/20 0758)  SpO2: (!) 94 % (09/07/20 0758) Vital Signs (24h Range):  Temp:  [96.4 °F (35.8 °C)-98.7 °F (37.1 °C)] 98.7 °F (37.1 °C)  Pulse:  [] 96  Resp:  [16-18] 16  SpO2:  [94 %-98 %] 94 %  BP: (122-168)/(71-92) 168/83     Weight: 56.7 kg (124 lb 14.4 oz)  Body mass index is 18.99 kg/m².    Intake/Output Summary (Last 24 hours) at 9/7/2020 1124  Last data filed at 9/6/2020 1800  Gross per 24 hour   Intake 360 ml   Output --   Net 360 ml      Physical Exam  Vitals signs and nursing note reviewed.   Constitutional:       General: He is not in acute distress.     Appearance: He is well-developed.   HENT:      Head: Normocephalic and atraumatic.       Mouth/Throat:      Mouth: Mucous membranes are moist.   Eyes:      Conjunctiva/sclera: Conjunctivae normal.      Pupils: Pupils are equal, round, and reactive to light.   Neck:      Musculoskeletal: Normal range of motion and neck supple.   Cardiovascular:      Rate and Rhythm: Normal rate and regular rhythm.      Heart sounds: Normal heart sounds.   Pulmonary:      Effort: Pulmonary effort is normal.      Breath sounds: Normal breath sounds.   Abdominal:      General: Bowel sounds are normal.      Palpations: Abdomen is soft.   Musculoskeletal:         General: Tenderness (right hip) present.      Comments: Surgical bandage clean/dry/intact   Skin:     General: Skin is warm and dry.   Neurological:      Mental Status: He is alert. He is confused.      Comments: Oriented to person.     Psychiatric:         Mood and Affect: Mood normal.         Behavior: Behavior normal.         Significant Labs: All pertinent labs within the past 24 hours have been reviewed.    Significant Imaging: I have reviewed and interpreted all pertinent imaging results/findings within the past 24 hours.      Assessment/Plan:      * Closed right hip fracture, initial encounter  Right hip x-ray reviewed  Status post right hip MRI arthroplasty with Dr. Porter  PT OT following.   for dc planning- current plan is home with re-initiation of hospice tomorrow  Pain control        Hypophosphatemia  On replacement protocol.      Alzheimer's dementia without behavioral disturbance  Chronic.  Fall and delirium precautions.  1) During the day, please open the shades and turn on the lights- If patient is in private room, please make sure they are close to the window.    2) Make sure the correct date and time is written on the whiteboard, as well as the current care team  3) Minimize interruptions at night-time, close shades and turn off TV.   4) When possible, during daytime make sure patient is in chair and provide activities that engage  patient.  5) Please make sure patient has hearing aids and glasses while awake.        Prostate cancer metastatic to bone  Followed by Hematology/Oncology at [facility].      Hypokalemia  Patient has hypokalemia which is currently controlled. Last electrolytes reviewed-   Recent Labs   Lab 09/03/20  0444 09/04/20  0538   K 4.0 3.8   . Will replace potassium and monitor electrolytes closely. Continuous telemetry.    Hypocalcemia  Replacement ordered.        VTE Risk Mitigation (From admission, onward)         Ordered     Place sequential compression device  Until discontinued      09/02/20 1613     Place DONAL hose  Until discontinued      09/02/20 1613     IP VTE HIGH RISK PATIENT  Once      09/02/20 1613     Place sequential compression device  Until discontinued      08/31/20 0212                Discharge Planning   BREANNE: 9/8/2020     Code Status: DNR   Is the patient medically ready for discharge?:     Reason for patient still in hospital (select all that apply): Other (specify) Awaiting placement at skilled nursing facility per family request.  Discharge Plan A:  Skilled nursing facility            Virginie Faith MD  Department of Hospital Medicine   [facility]

## 2020-09-07 NOTE — PLAN OF CARE
POC reviewed with pt; reorientation provided as necessary. Pt oriented to self. Pt ambulates with walker and two person assistance. Room air. Tele in use - ST with PVCs. Pt afebrile and denies any pain. PIV cdi. Immobilizer and SCDs in place. Surgical dressing cdi. Call bell in reach, bed locked, bed alarm on, and fall band and non skid socks on. Will continue to monitor.

## 2020-09-07 NOTE — PLAN OF CARE
Problem: Occupational Therapy Goal  Goal: Occupational Therapy Goal  Description: Goals to be met by: 9/9/2020     Patient will increase functional independence with ADLs by performing:    Feeding with Set-up Assistance and Supervision.  Grooming while EOB with Set-up Assistance and Stand-by Assistance.  Sitting at edge of bed x10 minutes with Supervision.  Upper extremity exercise program x10 reps per handout, with assistance as needed.  Pt to adhere to posterior hip precautions during all ADL & functional mobility tasks with cues provided.      Outcome: Ongoing, Progressing. Patient is making progress. Goals remain appropriate. Continue OT plan of care.

## 2020-09-08 PROBLEM — E43 SEVERE MALNUTRITION: Status: ACTIVE | Noted: 2020-09-08

## 2020-09-08 LAB
ALBUMIN SERPL BCP-MCNC: 2 G/DL (ref 3.5–5.2)
ALP SERPL-CCNC: 398 U/L (ref 55–135)
ALT SERPL W/O P-5'-P-CCNC: 9 U/L (ref 10–44)
ANION GAP SERPL CALC-SCNC: 10 MMOL/L (ref 8–16)
AST SERPL-CCNC: 21 U/L (ref 10–40)
BASOPHILS # BLD AUTO: 0.05 K/UL (ref 0–0.2)
BASOPHILS NFR BLD: 0.6 % (ref 0–1.9)
BILIRUB SERPL-MCNC: 0.4 MG/DL (ref 0.1–1)
BUN SERPL-MCNC: 6 MG/DL (ref 8–23)
CALCIUM SERPL-MCNC: 7.6 MG/DL (ref 8.7–10.5)
CHLORIDE SERPL-SCNC: 106 MMOL/L (ref 95–110)
CO2 SERPL-SCNC: 26 MMOL/L (ref 23–29)
CREAT SERPL-MCNC: 0.6 MG/DL (ref 0.5–1.4)
DIFFERENTIAL METHOD: ABNORMAL
EOSINOPHIL # BLD AUTO: 0.2 K/UL (ref 0–0.5)
EOSINOPHIL NFR BLD: 2.2 % (ref 0–8)
ERYTHROCYTE [DISTWIDTH] IN BLOOD BY AUTOMATED COUNT: 14.7 % (ref 11.5–14.5)
EST. GFR  (AFRICAN AMERICAN): >60 ML/MIN/1.73 M^2
EST. GFR  (NON AFRICAN AMERICAN): >60 ML/MIN/1.73 M^2
GLUCOSE SERPL-MCNC: 98 MG/DL (ref 70–110)
HCT VFR BLD AUTO: 30.2 % (ref 40–54)
HGB BLD-MCNC: 9.5 G/DL (ref 14–18)
IMM GRANULOCYTES # BLD AUTO: 0.08 K/UL (ref 0–0.04)
IMM GRANULOCYTES NFR BLD AUTO: 0.9 % (ref 0–0.5)
LYMPHOCYTES # BLD AUTO: 1.9 K/UL (ref 1–4.8)
LYMPHOCYTES NFR BLD: 22.9 % (ref 18–48)
MAGNESIUM SERPL-MCNC: 2 MG/DL (ref 1.6–2.6)
MCH RBC QN AUTO: 28.1 PG (ref 27–31)
MCHC RBC AUTO-ENTMCNC: 31.5 G/DL (ref 32–36)
MCV RBC AUTO: 89 FL (ref 82–98)
MONOCYTES # BLD AUTO: 1.2 K/UL (ref 0.3–1)
MONOCYTES NFR BLD: 14.5 % (ref 4–15)
NEUTROPHILS # BLD AUTO: 5 K/UL (ref 1.8–7.7)
NEUTROPHILS NFR BLD: 58.9 % (ref 38–73)
NRBC BLD-RTO: 0 /100 WBC
PHOSPHATE SERPL-MCNC: 1.6 MG/DL (ref 2.7–4.5)
PLATELET # BLD AUTO: 340 K/UL (ref 150–350)
PLATELET BLD QL SMEAR: ABNORMAL
PMV BLD AUTO: 9.3 FL (ref 9.2–12.9)
POTASSIUM SERPL-SCNC: 3.2 MMOL/L (ref 3.5–5.1)
PROT SERPL-MCNC: 5.2 G/DL (ref 6–8.4)
RBC # BLD AUTO: 3.38 M/UL (ref 4.6–6.2)
SODIUM SERPL-SCNC: 142 MMOL/L (ref 136–145)
WBC # BLD AUTO: 8.46 K/UL (ref 3.9–12.7)

## 2020-09-08 PROCEDURE — 97535 SELF CARE MNGMENT TRAINING: CPT

## 2020-09-08 PROCEDURE — 97802 MEDICAL NUTRITION INDIV IN: CPT

## 2020-09-08 PROCEDURE — 12000002 HC ACUTE/MED SURGE SEMI-PRIVATE ROOM

## 2020-09-08 PROCEDURE — 80053 COMPREHEN METABOLIC PANEL: CPT

## 2020-09-08 PROCEDURE — 25000003 PHARM REV CODE 250: Performed by: ORTHOPAEDIC SURGERY

## 2020-09-08 PROCEDURE — 94761 N-INVAS EAR/PLS OXIMETRY MLT: CPT

## 2020-09-08 PROCEDURE — 25000003 PHARM REV CODE 250: Performed by: HOSPITALIST

## 2020-09-08 PROCEDURE — 97110 THERAPEUTIC EXERCISES: CPT

## 2020-09-08 PROCEDURE — 36415 COLL VENOUS BLD VENIPUNCTURE: CPT

## 2020-09-08 PROCEDURE — 25000003 PHARM REV CODE 250: Performed by: INTERNAL MEDICINE

## 2020-09-08 PROCEDURE — 85025 COMPLETE CBC W/AUTO DIFF WBC: CPT

## 2020-09-08 PROCEDURE — 97116 GAIT TRAINING THERAPY: CPT | Mod: CQ

## 2020-09-08 PROCEDURE — 83735 ASSAY OF MAGNESIUM: CPT

## 2020-09-08 PROCEDURE — 84100 ASSAY OF PHOSPHORUS: CPT

## 2020-09-08 RX ORDER — ASPIRIN 81 MG/1
81 TABLET ORAL 2 TIMES DAILY
Status: DISCONTINUED | OUTPATIENT
Start: 2020-09-08 | End: 2020-09-11 | Stop reason: HOSPADM

## 2020-09-08 RX ADMIN — ASPIRIN 81 MG: 81 TABLET, COATED ORAL at 08:09

## 2020-09-08 RX ADMIN — QUETIAPINE 25 MG: 25 TABLET ORAL at 08:09

## 2020-09-08 RX ADMIN — DOCUSATE SODIUM 100 MG: 100 CAPSULE, LIQUID FILLED ORAL at 08:09

## 2020-09-08 RX ADMIN — POTASSIUM CHLORIDE 40 MEQ: 1.5 POWDER, FOR SOLUTION ORAL at 10:09

## 2020-09-08 RX ADMIN — FAMOTIDINE 20 MG: 20 TABLET ORAL at 08:09

## 2020-09-08 RX ADMIN — POTASSIUM & SODIUM PHOSPHATES POWDER PACK 280-160-250 MG 2 PACKET: 280-160-250 PACK at 10:09

## 2020-09-08 RX ADMIN — POTASSIUM & SODIUM PHOSPHATES POWDER PACK 280-160-250 MG 2 PACKET: 280-160-250 PACK at 06:09

## 2020-09-08 RX ADMIN — ASPIRIN 81 MG: 81 TABLET, COATED ORAL at 03:09

## 2020-09-08 NOTE — PHYSICIAN QUERY
PT Name: Kevyn Terrell Jr.  MR #: [id number]    HEMATOLOGY CLARIFICATION      CDS/: Darlin Gonzalez RN               Contact information:[email or web address]    This form is a permanent document in the medical record.      Query Date: September 8, 2020    By submitting this query, we are merely seeking further clarification of documentation. Please utilize your independent clinical judgment when addressing the question(s) below.    The Medical Record contains the following:   Indicators  Supporting Clinical Findings Location in Medical Record    Anemia documented     x H&H H&H= 12.3/38.9  H&H=   8.9/29.4  H&H=   8.5/26.6 Labs 8/30  Labs 9/1  Labs 9/4   x BP                    HR BP: (137-202)/(57-97)   Pulse:  []  BP: (117-169)/(56-72)   Pulse:  [67-77]   BP: (112-145)/(53-67)   Pulse:  []  H&P  Hospital Medicine PN 9/1  Hospital Medicine PN 9/4    GI bleeding documented      Acute bleeding (Non GI site)      Transfusion(s)     x Acute/Chronic illness R hip fracture, alzheimer's dementia, hypokalemia, Prostate cancer metastatic to bone Hospital Medicine H&P    Treatments     x Other R hip hemiarthroplasty 200 cc EBL OP note 9/2     Provider, please specify diagnosis or diagnoses associated with above clinical findings.   [   ] Acute blood loss anemia    [  x ] Acute blood loss anemia expected post-operatively    [   ] Precipitous drop in Hematocrit   [   ] Anemia, unspecified    [   ] Other Hematological Diagnosis (please specify): _________________   [   ] Clinically Undetermined     Present on admission (POA) status:   [   ] Yes (Y)                          [  ] Clinically Undetermined (W)  [   ] No (N)                            [   ] Documentation insufficient to determine if condition is POA (U)          Please document in your progress notes daily for the duration of treatment, until resolved, and include in your discharge summary.

## 2020-09-08 NOTE — PT/OT/SLP PROGRESS
Occupational Therapy   Treatment    Name: Kevyn Terrell Jr.  MRN: [id number]  Admitting Diagnosis:  Closed right hip fracture, initial encounter  6 Days Post-Op    Recommendations:     Discharge Recommendations: nursing facility, skilled  Discharge Equipment Recommendations:  bath bench  Barriers to discharge:  Decreased caregiver support    Assessment:     Kevyn Terrell Jr. is a 89 y.o. male with a medical diagnosis of Closed right hip fracture, initial encounter.  He presents with resistance to sitting at EOB or getting OOB, despite much encouragement. He was agreeable to d=self care tasks and B UE exercises with HOB raised and was able to assist with changing his brief by bridging in supine with minimal assistance and cues for technique.     Patient was jovial at times and resistant at others, stating he wished to be covered up and allowed to sleep. Continue OT treatment to maximize safety & independence with ADLs.    Performance deficits affecting function are weakness, impaired functional mobilty, impaired cognition, decreased safety awareness, impaired endurance, impaired balance, impaired self care skills, decreased lower extremity function, decreased ROM, orthopedic precautions, decreased coordination.     Rehab Prognosis:  Fair; patient would benefit from acute skilled OT services to address these deficits and reach maximum level of function.       Plan:     Patient to be seen 4 x/week to address the above listed problems via self-care/home management, therapeutic activities, therapeutic exercises  · Plan of Care Expires: 09/15/20  · Plan of Care Reviewed with: patient    Subjective     Pain/Comfort:  · Pain Rating 1: 0/10  · Pain Rating Post-Intervention 1: 0/10    Objective:     Communicated with: nurse Oleksandr prior to session.  Patient found supine trying to open a milk carton with bed alarm, telemetry, knee immobilizer, SCD([employer]) upon OT entry to room.    General Precautions: Standard, fall    Orthopedic Precautions:RLE weight bearing as tolerated, RLE posterior precautions   Braces: Knee immobilizer     Occupational Performance:     Bed Mobility:    · Patient completed Bridging in supine with cues and moderate to minimum assistance and 1 persons. Scooting in supine to HOB total assist of 2 persons with draw sheet.    Activities of Daily Living:  · Feeding: OT repositioned pt to HOB in upright, midline sitting for self feeding & ed pt on safe positioning with self feeding to increase independence & reduce risk of aspiration with task. Pt completed task with stand by assistance and set-up to open milk carton and drink from it through a straw using R hand  · Grooming: stand by assistance and set-up to wash face and hands and comb hair with HOB raised  · Toileting: total assistance to change brief in supine with pt assiting by bridging using L leg with cueing      [facility] 6 Click ADL: 14    Treatment & Education:  OT ed pt on OT role & POC as well as discharge recommendations.  OT ed pt on bed mobility techniques to increase independence with task.  OT ed pt on keeping HOB raised and sitting up in chair as pt will tolerate to counteract effects of bedrest.  OT issued rubber squeeze ball to pt & educated pt on hand resistive gross grasp and pinch HEP with instructions to squeeze ball 25 times every two hours while awake. Pt performed 25 reps each exercise with cues for proper form.  Pt performed B UE AROM therapeutic exercise x 15 reps each shoulder flexion, horizontal abduction/adduction and boxing punches with rest breaks taken between sets.  OT ed pt on fall risk and strongly advised pt to call for help for all OOB mobility.    Patient left HOB elevated with all lines intact, call button in reach and bed alarm onEducation:      GOALS:   Multidisciplinary Problems     Occupational Therapy Goals        Problem: Occupational Therapy Goal    Goal Priority Disciplines Outcome Interventions   Occupational  Therapy Goal     OT, PT/OT Ongoing, Progressing    Description: Goals to be met by: 9/9/2020     Patient will increase functional independence with ADLs by performing:    Feeding with Set-up Assistance and Supervision.  Grooming while EOB with Set-up Assistance and Stand-by Assistance.  Sitting at edge of bed x10 minutes with Supervision.  Upper extremity exercise program x10 reps per handout, with assistance as needed.  Pt to adhere to posterior hip precautions during all ADL & functional mobility tasks with cues provided.                       Time Tracking:     OT Date of Treatment: 09/08/20  OT Start Time: 0907  OT Stop Time: 0939  OT Total Time (min): 32 min    Billable Minutes:Self Care/Home Management 16  Therapeutic Exercise 16    ABRAHAM Guillermo  9/8/2020

## 2020-09-08 NOTE — ASSESSMENT & PLAN NOTE
Contributing Nutrition Diagnosis  Severe chronic illness related malnutrition  Related to (etiology):   Increased energy needs d/t CA, Decreased appetite and trouble with nutrition related ADLs d/t Alzheimers    Signs and Symptoms (as evidenced by):   1) PO intakes < 75% EEN x > 1 month  2) 22% wt loss in < 1 year ( 4-9/2019) -wt loss continues  * of note moderate fat/muscle wasting as charted below    Interventions:  above    Nutrition Diagnosis Status:   new

## 2020-09-08 NOTE — SUBJECTIVE & OBJECTIVE
Interval History:  Patient seen and examined.  Remains pleasantly confused.  No subjective complaints.    Review of Systems   Unable to perform ROS: Dementia     Objective:     Vital Signs (Most Recent):  Temp: 98.4 °F (36.9 °C) (09/08/20 0732)  Pulse: 71 (09/08/20 0732)  Resp: 17 (09/08/20 0732)  BP: 128/64 (09/08/20 0732)  SpO2: 95 % (09/08/20 0732) Vital Signs (24h Range):  Temp:  [98 °F (36.7 °C)-99 °F (37.2 °C)] 98.4 °F (36.9 °C)  Pulse:  [] 71  Resp:  [16-18] 17  SpO2:  [94 %-97 %] 95 %  BP: (128-164)/(64-78) 128/64     Weight: 58.3 kg (128 lb 9.6 oz)  Body mass index is 19.55 kg/m².    Intake/Output Summary (Last 24 hours) at 9/8/2020 0856  Last data filed at 9/7/2020 1800  Gross per 24 hour   Intake 480 ml   Output --   Net 480 ml      Physical Exam  Vitals signs and nursing note reviewed.   Constitutional:       General: He is not in acute distress.     Appearance: He is well-developed.   HENT:      Head: Normocephalic and atraumatic.      Mouth/Throat:      Mouth: Mucous membranes are moist.   Eyes:      Conjunctiva/sclera: Conjunctivae normal.      Pupils: Pupils are equal, round, and reactive to light.   Neck:      Musculoskeletal: Normal range of motion and neck supple.   Cardiovascular:      Rate and Rhythm: Normal rate and regular rhythm.      Heart sounds: Normal heart sounds.   Pulmonary:      Effort: Pulmonary effort is normal.      Breath sounds: Normal breath sounds.   Abdominal:      General: Bowel sounds are normal.      Palpations: Abdomen is soft.   Musculoskeletal:         General: Tenderness (right hip) present.      Comments: Surgical bandage clean/dry/intact   Skin:     General: Skin is warm and dry.   Neurological:      Mental Status: He is alert. He is confused.      Comments: Oriented to person.     Psychiatric:         Mood and Affect: Mood normal.         Behavior: Behavior normal.         Significant Labs: All pertinent labs within the past 24 hours have been  reviewed.    Significant Imaging: I have reviewed and interpreted all pertinent imaging results/findings within the past 24 hours.

## 2020-09-08 NOTE — PLAN OF CARE
Plan of care reviewed with pt. Worked with therapy. Regular diet. DNR. Pt pleasantly confused. Denies pain. Bed locked and low, call light within reach. Rounding for safety.

## 2020-09-08 NOTE — PLAN OF CARE
Problem: Fall Injury Risk  Goal: Absence of Fall and Fall-Related Injury  Outcome: Ongoing, Progressing  Intervention: Identify and Manage Contributors to Fall Injury Risk  Flowsheets (Taken 9/7/2020 2121)  Self-Care Promotion:   independence encouraged   BADL personal objects within reach  Medication Review/Management: medications reviewed  Intervention: Promote Injury-Free Environment  Flowsheets (Taken 9/7/2020 2121)  Safety Promotion/Fall Prevention:   assistive device/personal item within reach   bed alarm set  Environmental Safety Modification: assistive device/personal items within reach     Problem: Skin Injury Risk Increased  Goal: Skin Health and Integrity  Outcome: Ongoing, Progressing  Intervention: Optimize Skin Protection  Flowsheets (Taken 9/7/2020 2121)  Pressure Reduction Techniques: frequent weight shift encouraged  Skin Protection:   adhesive use limited   transparent dressing maintained  Head of Bed (HOB): HOB elevated     Problem: Infection  Goal: Infection Symptom Resolution  Outcome: Ongoing, Progressing  Intervention: Prevent or Manage Infection  Flowsheets (Taken 9/7/2020 2121)  Fever Reduction/Comfort Measures: lightweight bedding  Infection Management: aseptic technique maintained     Problem: Pain (Hip Arthroplasty)  Goal: Acceptable Pain Control  Outcome: Ongoing, Progressing  Intervention: Prevent or Manage Pain  Flowsheets (Taken 9/7/2020 2121)  Pain Management Interventions:   quiet environment facilitated   relaxation techniques promoted   pain management plan reviewed with patient/caregiver     Problem: Wound  Goal: Optimal Wound Healing  Outcome: Ongoing, Progressing  Intervention: Promote Effective Wound Healing  Flowsheets (Taken 9/7/2020 2121)  Sleep/Rest Enhancement:   awakenings minimized   regular sleep/rest pattern promoted  Pain Management Interventions:   quiet environment facilitated   relaxation techniques promoted   pain management plan reviewed with  patient/caregiver

## 2020-09-08 NOTE — PT/OT/SLP PROGRESS
Physical Therapy Treatment    Patient Name:  Kevyn Terrell Jr.   MRN:  [id number]    Recommendations:     Discharge Recommendations:  nursing facility, skilled   Discharge Equipment Recommendations: other (see comments)(TBD at the next level of care)   Barriers to discharge: None    Assessment:     Kevyn Terrell Jr. is a 89 y.o. male admitted with a medical diagnosis of Closed right hip fracture, initial encounter.  He presents with the following impairments/functional limitations:  weakness, impaired endurance, impaired self care skills, impaired functional mobilty, gait instability, impaired balance, decreased lower extremity function, decreased safety awareness, orthopedic precautions . Tolerated treatment . Requires assistance for safety with mobility as well as to adhere to orthopedic precautions. Unsteady upon standing , improves with time. Decreased endurance.     Rehab Prognosis: Fair; patient would benefit from acute skilled PT services to address these deficits and reach maximum level of function.    Recent Surgery: Procedure(s) (LRB):  HEMIARTHROPLASTY, HIP, peg board, 1st assist (Right) 6 Days Post-Op    Plan:     During this hospitalization, patient to be seen BID to address the identified rehab impairments via gait training, therapeutic activities, therapeutic exercises and progress toward the following goals:    · Plan of Care Expires:  10/03/20    Subjective     Chief Complaint: reports feeling tired , wants to sleep.   Patient/Family Comments/goals: none stated  Pain/Comfort:  · Pain Rating 1: 0/10      Objective:     Communicated with nurse Leggett prior to session.  Patient found supine with bed alarm, telemetry, SCD(knee immobilizer) upon PT entry to room.     General Precautions: Standard, fall   Orthopedic Precautions:RLE weight bearing as tolerated, RLE posterior precautions   Braces: Knee immobilizer     Functional Mobility:  · Bed Mobility:     · Rolling Left:  minimum  assistance  · Rolling Right: minimum assistance  · Supine to Sit: minimum assistance  · Sit to Supine: minimum assistance  · Transfers:     · Sit to Stand:  moderate assistance with rolling walker  · Gait: 8' to restroom, 100' in hallway with rw and Mod A , WBAT RLE with KI in place.       AM-PAC 6 CLICK MOBILITY          Therapeutic Activities and Exercises:   Ambulated to restroom, on / off commode with Max A.    Extra time sit to stand, unsteady initially, requires frequent verbal cues to correct.   Ambulated in hallway , expressed desire to return to bed for a nap , frequently.    Returned to room to bed.      Patient left supine with all lines intact, call button in reach, bed alarm on and nurse Oleksandr notified..    GOALS:   Multidisciplinary Problems     Physical Therapy Goals        Problem: Physical Therapy Goal    Goal Priority Disciplines Outcome Goal Variances Interventions   Physical Therapy Goal     PT, PT/OT Ongoing, Progressing     Description: Goals to be met by: 10/3/2020     Patient will increase functional independence with mobility by performin. Supine to sit with MInimal Assistance  2. Sit to stand transfer with Minimal Assistance  3. Bed to chair transfer with Minimal Assistance using Rolling Walker  4. Gait  x 200 feet with Minimal Assistance using Rolling Walker.                      Time Tracking:     PT Received On: 20  PT Start Time: 1033     PT Stop Time: 1050  PT Total Time (min): 17 min     Billable Minutes: Gait Training 17min    Treatment Type: Treatment  PT/PTA: PTA     PTA Visit Number: 2     Gale Wyatt PTA  2020

## 2020-09-08 NOTE — PROGRESS NOTES
[facility] Hospital Medicine  Progress Note    Patient Name: Kevyn Terrell Jr.  MRN: [id number]  Patient Class: IP- Inpatient   Admission Date: 8/30/2020  Length of Stay: 8 days  Attending Physician: Virginie Faith MD  Primary Care Provider: Carole Rangel MD        Subjective:     Principal Problem:Closed right hip fracture, initial encounter    HPI:  Kevyn Terrell Jr. is a 89 y.o. male with a PMHx of Alzheimer's disease and metastatic cancer who presented to the ED via EMS with right hip pain following a fall earlier today.  Pt's granddaughter states that she had gone to the grocery store and when she returned, she found pt on the ground.  He had apparently fallen down 2 stairs at his trailer.  Pt is pleasantly confused and does not remember the incident.  History is obtained from the granddaughter.  Pt was recently medicated for pain and has no complaints at this time.        Overview/Hospital Course:  No notes on file    Interval History:  Patient seen and examined.  Remains pleasantly confused.  No subjective complaints.    Review of Systems   Unable to perform ROS: Dementia     Objective:     Vital Signs (Most Recent):  Temp: 98.4 °F (36.9 °C) (09/08/20 0732)  Pulse: 71 (09/08/20 0732)  Resp: 17 (09/08/20 0732)  BP: 128/64 (09/08/20 0732)  SpO2: 95 % (09/08/20 0732) Vital Signs (24h Range):  Temp:  [98 °F (36.7 °C)-99 °F (37.2 °C)] 98.4 °F (36.9 °C)  Pulse:  [] 71  Resp:  [16-18] 17  SpO2:  [94 %-97 %] 95 %  BP: (128-164)/(64-78) 128/64     Weight: 58.3 kg (128 lb 9.6 oz)  Body mass index is 19.55 kg/m².    Intake/Output Summary (Last 24 hours) at 9/8/2020 0856  Last data filed at 9/7/2020 1800  Gross per 24 hour   Intake 480 ml   Output --   Net 480 ml      Physical Exam  Vitals signs and nursing note reviewed.   Constitutional:       General: He is not in acute distress.     Appearance: He is well-developed.   HENT:      Head: Normocephalic and atraumatic.      Mouth/Throat:       Mouth: Mucous membranes are moist.   Eyes:      Conjunctiva/sclera: Conjunctivae normal.      Pupils: Pupils are equal, round, and reactive to light.   Neck:      Musculoskeletal: Normal range of motion and neck supple.   Cardiovascular:      Rate and Rhythm: Normal rate and regular rhythm.      Heart sounds: Normal heart sounds.   Pulmonary:      Effort: Pulmonary effort is normal.      Breath sounds: Normal breath sounds.   Abdominal:      General: Bowel sounds are normal.      Palpations: Abdomen is soft.   Musculoskeletal:         General: Tenderness (right hip) present.      Comments: Surgical bandage clean/dry/intact   Skin:     General: Skin is warm and dry.   Neurological:      Mental Status: He is alert. He is confused.      Comments: Oriented to person.     Psychiatric:         Mood and Affect: Mood normal.         Behavior: Behavior normal.         Significant Labs: All pertinent labs within the past 24 hours have been reviewed.    Significant Imaging: I have reviewed and interpreted all pertinent imaging results/findings within the past 24 hours.      Assessment/Plan:      * Closed right hip fracture, initial encounter  Right hip x-ray reviewed  Status post right hip MRI arthroplasty with Dr. Porter  PT OT following.   for dc planning- current plan is home with re-initiation of hospice tomorrow  Pain control        Hypophosphatemia  On replacement protocol.      Alzheimer's dementia without behavioral disturbance  Chronic.  Fall and delirium precautions.  1) During the day, please open the shades and turn on the lights- If patient is in private room, please make sure they are close to the window.    2) Make sure the correct date and time is written on the whiteboard, as well as the current care team  3) Minimize interruptions at night-time, close shades and turn off TV.   4) When possible, during daytime make sure patient is in chair and provide activities that engage patient.  5) Please  make sure patient has hearing aids and glasses while awake.        Prostate cancer metastatic to bone  Followed by Hematology/Oncology at [facility].      Hypokalemia  Patient has hypokalemia which is currently controlled. Last electrolytes reviewed-   Recent Labs   Lab 09/03/20  0444 09/04/20  0538   K 4.0 3.8   . Will replace potassium and monitor electrolytes closely. Continuous telemetry.    Hypocalcemia  Replacement ordered.        VTE Risk Mitigation (From admission, onward)         Ordered     Place sequential compression device  Until discontinued      09/02/20 1613     Place DONAL hose  Until discontinued      09/02/20 1613     IP VTE HIGH RISK PATIENT  Once      09/02/20 1613                Discharge Planning   BREANNE: 9/9/2020     Code Status: DNR   Is the patient medically ready for discharge?:     Reason for patient still in hospital (select all that apply):  Awaiting placement at skilled nursing facility  Discharge Plan A:  Skilled nursing facility       Virginie Faith MD  Department of Hospital Medicine   [facility]

## 2020-09-08 NOTE — PLAN OF CARE
Intervention: nutrition supplement therapy and general healthful diet     Recommendation:  1) Continue regular diet   2) Continue boost plus with meals   3) consider appetite stimulant- nutrition support likely not within goals of care   4) weigh pt weekly    Goals: 1) PO intakes > 50% of meals and supplements at f/u  Nutrition Goal Status: new  Communication of RD Recs: (POC, sticky note)

## 2020-09-08 NOTE — PLAN OF CARE
Problem: Physical Therapy Goal  Goal: Physical Therapy Goal  Description: Goals to be met by: 10/3/2020     Patient will increase functional independence with mobility by performin. Supine to sit with MInimal Assistance  2. Sit to stand transfer with Minimal Assistance  3. Bed to chair transfer with Minimal Assistance using Rolling Walker  4. Gait  x 200 feet with Minimal Assistance using Rolling Walker.     Outcome: Ongoing, Progressing   Ambulate with rw and assistance for safety, WBAT RLE with KI in place.

## 2020-09-08 NOTE — PROGRESS NOTES
"[facility]  Adult Nutrition  Progress Note- 30 min    SUMMARY      Intervention: nutrition supplement therapy and general healthful diet     Recommendation:  1) Continue regular diet   2) Continue boost plus with meals   3) consider appetite stimulant- nutrition support likely not within goals of care   4) weigh pt weekly    Goals: 1) PO intakes > 50% of meals and supplements at f/u  Nutrition Goal Status: new  Communication of RD Recs: (POC, sticky note)    Reason for Assessment    Reason For Assessment: length of stay  Diagnosis: (closed R. hip fracture)  Relevant Medical History: Alzheimer's, metestatic bone CA  Interdisciplinary Rounds: attended    General Information Comments: 89 y/o male admitted with hip fracture s/p fall at home. Noted to be pleasantly confused and incontinent of bowel and bladder. poor-fair appetite. Boost ordered with meals. NFPE done 20, no moderate-severe wasting seen. Slow wt loss over time (71 lb since 2017), significant 22% 2019-2019 still losing.    Nutrition Discharge Planning: to be determined- regular to promote PO intakes + boost plus TID    Nutrition Risk Screen    Nutrition Risk Screen: no indicators present    Nutrition/Diet History    Patient Reported Diet/Restrictions/Preferences: general  Spiritual, Cultural Beliefs, Hinduism Practices, Values that Affect Care: no  Food Allergies: NKFA  Factors Affecting Nutritional Intake: decreased appetite, impaired cognitive status/motor control    Anthropometrics    Temp: 98.6 °F (37 °C)  Height Method: Measured(19 ED)  Height: 5' 8"  Height (inches): 68 in  Weight Method: Bed Scale  Weight: 58.3 kg (128 lb 8.5 oz)  Weight (lb): 128.53 lb  Ideal Body Weight (IBW), Male: 154 lb  % Ideal Body Weight, Male (lb): 83.46 %  BMI (Calculated): 19.5  BMI Grade: 18.5-24.9 - normal(underweight for age)  Usual Body Weight (UBW), k.7 kg(10/15/17)  % Usual Body Weight: 64.41  % Weight Change From Usual Weight: " -35.72 %       Lab/Procedures/Meds    Pertinent Labs Reviewed: reviewed  BMP  Lab Results   Component Value Date     09/04/2020    K 3.8 09/04/2020     09/04/2020    CO2 22 (L) 09/04/2020    BUN 8 09/04/2020    CREATININE 0.6 09/04/2020    CALCIUM 7.3 (L) 09/04/2020    ANIONGAP 7 (L) 09/04/2020    ESTGFRAFRICA >60 09/04/2020    EGFRNONAA >60 09/04/2020     Lab Results   Component Value Date    CALCIUM 7.3 (L) 09/04/2020    PHOS 1.1 (L) 09/04/2020     Lab Results   Component Value Date    ALBUMIN 2.9 (L) 08/30/2020     Pertinent Medications Reviewed: reviewed  Pertinent Medications Comments: docusate, zofranm KNaPhos, polyethylene glycol    Estimated/Assessed Needs    Weight Used For Calorie Calculations: 58.3 kg (128 lb 8.5 oz)  Energy Calorie Requirements (kcal): MSJ ( x 1.5 wt gain) = 1830 kcal  Energy Need Method: [place]  Protein Requirements: 1.2-1.5 g protein/kg (age/wasting) = 70-87 g protein  Weight Used For Protein Calculations: 58.3 kg (128 lb 8.5 oz)  Fluid Requirements (mL): 1850 ml  Estimated Fluid Requirement Method: RDA Method  CHO Requirement: N/A      Nutrition Prescription Ordered    Current Diet Order: regular + boost plus TID    Evaluation of Received Nutrient/Fluid Intake    Energy Calories Required: not meeting needs  Protein Required: not meeting needs  Fluid Required: not meeting needs  Tolerance: tolerating  % Intake of Estimated Energy Needs: 50-75%  % Meal Intake: 25-50% ( boost 1-2 x daily)    Nutrition Risk    Level of Risk/Frequency of Follow-up: moderate 2 x weekly    Assessment and Plan    Severe malnutrition  Contributing Nutrition Diagnosis  Severe chronic illness related malnutrition  Related to (etiology):   Increased energy needs d/t CA, Decreased appetite and trouble with nutrition related ADLs d/t Alzheimers    Signs and Symptoms (as evidenced by):   1) PO intakes < 75% EEN x > 1 month  2) 22% wt loss in < 1 year ( 4-9/2019) -wt loss continues  * of note  moderate fat/muscle wasting as charted below    Interventions:  above    Nutrition Diagnosis Status:   new           Monitor and Evaluation    Food and Nutrient Intake: energy intake, food and beverage intake  Food and Nutrient Adminstration: diet order  Anthropometric Measurements: weight  Biochemical Data, Medical Tests and Procedures: electrolyte and renal panel  Nutrition-Focused Physical Findings: overall appearance     Malnutrition Assessment  Malnutrition Type: chronic illness  Skin (Micronutrient): (Hemal =18, hip incision)  Teeth (Micronutrient): (missing some)   Micronutrient Evaluation: suspected deficiency  Micronutrient Evaluation Comments: phos   Energy Intake (Malnutrition): less than 75% for greater than or equal to 1 month   Orbital Region (Subcutaneous Fat Loss): moderate depletion  Upper Arm Region (Subcutaneous Fat Loss): severe depletion  Thoracic and Lumbar Region: moderate depletion   Gnosticism Region (Muscle Loss): moderate depletion  Clavicle Bone Region (Muscle Loss): moderate depletion  Clavicle and Acromion Bone Region (Muscle Loss): moderate depletion  Scapular Bone Region (Muscle Loss): moderate depletion  Dorsal Hand (Muscle Loss): moderate depletion  Patellar Region (Muscle Loss): moderate depletion  Anterior Thigh Region (Muscle Loss): moderate depletion  Posterior Calf Region (Muscle Loss): moderate depletion   Edema (Fluid Accumulation): 0-->no edema present   Subcutaneous Fat Loss (Final Summary): moderate protein-calorie malnutrition  Muscle Loss Evaluation (Final Summary): moderate protein-calorie malnutrition         Nutrition Follow-Up    RD Follow-up?: Yes

## 2020-09-08 NOTE — PLAN OF CARE
Problem: Occupational Therapy Goal  Goal: Occupational Therapy Goal  Description: Goals to be met by: 9/15/2020     Patient will increase functional independence with ADLs by performing:    Feeding with Set-up Assistance and Supervision.  Grooming while EOB with Set-up Assistance and Stand-by Assistance.  Sitting at edge of bed x10 minutes with Supervision.  Upper extremity exercise program x10 reps per handout, with assistance as needed.  Pt to adhere to posterior hip precautions during all ADL & functional mobility tasks with cues provided.    Patient is making progress. However, he needs encouragement to get OOB or to EOB.  Goals remain appropriate. OT plan of care continued through 09/15/2020 with frequency increased from 3x/week to 4x/week.  ABRAHAM Olvera  9/8/2020      Outcome: Ongoing, Progressing

## 2020-09-08 NOTE — PT/OT/SLP PROGRESS
Physical Therapy      Patient Name:  Kevyn Terrell Jr.   MRN:  [id number]    Patient not seen today secondary to Patient unwilling to participate(could not encourage, reports upset stomach, nurse Oleksandr informed.). Will follow-up 9/9/20.    Gale Wyatt, PTA

## 2020-09-08 NOTE — PHYSICIAN QUERY
PT Name: Kevyn Terrell Jr.  MR #: [id number]    Nutrition Clarification     CDS/: Darlin Gonzalez RN               Contact information: [email or web address]    This form is a permanent document in the medical record.     Query Date: 2020    By submitting this query, we are merely seeking further clarification of documentation.. Please utilize your independent clinical judgment when addressing the question(s) below.    The medical record contains the following:   Indicators  Supporting Clinical Findings Location in Medical Record   x % of Estimated Energy Intake over a time frame from p.o., TF, or TPN -decreased appetite, impaired cognitive status/motor control  -% Meal Intake: 25-50% ( boost 1-2 x daily) Nutrition note    x Weight Status over a time frame significant 22% 2019-2019 still losing  % Weight Change From Usual Weight: -35.72 % Nutrition note    x Subcutaneous Fat and/or Muscle Loss moderate fat/muscle wasting  Nutrition note     Fluid Accumulation or Edema     x Wt/BMI/Usual Body Weight Weight Method: Bed Scale  Weight: 58.3 kg (128 lb 8.5 oz)  Usual Body Weight, k.7 kg(10/15/17)  BMI (Calculated): 19.5 Nutrition note     Delayed Wound Healing/Failure to Thrive     x Acute or Chronic Illness R. hip fracture, Alzheimer's dementia, hypokalemia, prostate cancer metastatic to bone  H&P    Medication     x Treatment Recommendation:  1) Continue regular diet   2) Continue boost plus with meals   3) consider appetite stimulant- nutrition support likely not within goals of care  Nutrition note    x Other -Severe malnutrition  -Severe chronic illness related malnutrition  -Increased energy needs d/t CA Nutrition note      AND / ASPEN Clinical Characteristics (2011)  A minimum of two characteristics is recommended for diagnosing either moderate or severe malnutrition   Mild Malnutrition Moderate Malnutrition Severe Malnutrition   Energy Intake from p.o.,  TF or TPN. < 75% intake of estimated energy needs for less than 7 days < 75% intake of estimated energy needs for greater than 7 days < 50% intake of estimated energy needs for > 5 days   Weight Loss 1-2% in 1 month  5% in 3 months  7.5% in 6 months  10% in 1 year 1-2 % in 1 week  5% in 1 month  7.5% in 3 months  10% in 6 months  20% in 1 year > 2% in 1 week  > 5% in 1 month  > 7.5% in 3 months  > 10% in 6 months  > 20% in 1 year   Physical Findings     None *Mild subcutaneous fat and/or muscle loss  *Mild fluid accumulation  *Stage II decubitus  *Surgical wound or non-healing wound *Mod/severe subcutaneous fat and/or muscle loss  *Mod/severe fluid accumulation  *Stage III or IV decubitus  *Non-healing surgical wound     Provider, please specify diagnosis or diagnoses associated with above clinical findings.    [  ] Severe Protein-Calorie Malnutrition   [x  ] Malnutrition, Unspecified degree   [  ] Other Nutritional Diagnosis (please specify): _______   [  ] Clinically Undetermined     Present on admission (POA) status:   [   ] Yes (Y)                          [  ] Clinically Undetermined (W)              [   ] No (N)                            [   ] Documentation insufficient to determine if condition is POA (U)     Please document in your progress notes daily for the duration of treatment until resolved and include in your discharge summary.

## 2020-09-08 NOTE — RESPIRATORY THERAPY
09/07/20 1948   Patient Assessment/Suction   Level of Consciousness (AVPU) alert   Respiratory Effort Unlabored   PRE-TX-O2   O2 Device (Oxygen Therapy) room air   SpO2 97 %   Pulse Oximetry Type Intermittent   $ Pulse Oximetry - Multiple Charge Pulse Oximetry - Multiple

## 2020-09-08 NOTE — NURSING
Patient alert, pleasantly confused, calm and cooperative with care. Miralax held and colace held, as multiple loose BMs previous day. Incontinent of bowel and bladder. Tele monitoring, PVCs  noted throughout shift. Immobilizer in place. Dressing to Right hip intact. Discharge pending SNF placement. Bed alarm and SCDs on.

## 2020-09-08 NOTE — PLAN OF CARE
09/08/20 0810   PRE-TX-O2   O2 Device (Oxygen Therapy) room air   SpO2 95 %   Pulse Oximetry Type Intermittent   $ Pulse Oximetry - Multiple Charge Pulse Oximetry - Multiple

## 2020-09-09 LAB
ALBUMIN SERPL BCP-MCNC: 2.1 G/DL (ref 3.5–5.2)
ALP SERPL-CCNC: 388 U/L (ref 55–135)
ALT SERPL W/O P-5'-P-CCNC: 8 U/L (ref 10–44)
ANION GAP SERPL CALC-SCNC: 11 MMOL/L (ref 8–16)
AST SERPL-CCNC: 21 U/L (ref 10–40)
BASOPHILS # BLD AUTO: 0.07 K/UL (ref 0–0.2)
BASOPHILS NFR BLD: 0.8 % (ref 0–1.9)
BILIRUB SERPL-MCNC: 0.5 MG/DL (ref 0.1–1)
BUN SERPL-MCNC: 7 MG/DL (ref 8–23)
CALCIUM SERPL-MCNC: 7.7 MG/DL (ref 8.7–10.5)
CHLORIDE SERPL-SCNC: 110 MMOL/L (ref 95–110)
CO2 SERPL-SCNC: 22 MMOL/L (ref 23–29)
CREAT SERPL-MCNC: 0.6 MG/DL (ref 0.5–1.4)
DIFFERENTIAL METHOD: ABNORMAL
EOSINOPHIL # BLD AUTO: 0.2 K/UL (ref 0–0.5)
EOSINOPHIL NFR BLD: 2 % (ref 0–8)
ERYTHROCYTE [DISTWIDTH] IN BLOOD BY AUTOMATED COUNT: 15.1 % (ref 11.5–14.5)
EST. GFR  (AFRICAN AMERICAN): >60 ML/MIN/1.73 M^2
EST. GFR  (NON AFRICAN AMERICAN): >60 ML/MIN/1.73 M^2
GLUCOSE SERPL-MCNC: 71 MG/DL (ref 70–110)
HCT VFR BLD AUTO: 33.1 % (ref 40–54)
HGB BLD-MCNC: 9.7 G/DL (ref 14–18)
IMM GRANULOCYTES # BLD AUTO: 0.08 K/UL (ref 0–0.04)
IMM GRANULOCYTES NFR BLD AUTO: 0.9 % (ref 0–0.5)
LYMPHOCYTES # BLD AUTO: 1.9 K/UL (ref 1–4.8)
LYMPHOCYTES NFR BLD: 21 % (ref 18–48)
MAGNESIUM SERPL-MCNC: 2.1 MG/DL (ref 1.6–2.6)
MCH RBC QN AUTO: 27.1 PG (ref 27–31)
MCHC RBC AUTO-ENTMCNC: 29.3 G/DL (ref 32–36)
MCV RBC AUTO: 93 FL (ref 82–98)
MONOCYTES # BLD AUTO: 1.3 K/UL (ref 0.3–1)
MONOCYTES NFR BLD: 13.7 % (ref 4–15)
NEUTROPHILS # BLD AUTO: 5.7 K/UL (ref 1.8–7.7)
NEUTROPHILS NFR BLD: 61.6 % (ref 38–73)
NRBC BLD-RTO: 0 /100 WBC
PHOSPHATE SERPL-MCNC: 2.1 MG/DL (ref 2.7–4.5)
PLATELET # BLD AUTO: 357 K/UL (ref 150–350)
PMV BLD AUTO: 9.5 FL (ref 9.2–12.9)
POTASSIUM SERPL-SCNC: 5.2 MMOL/L (ref 3.5–5.1)
PROT SERPL-MCNC: 5.3 G/DL (ref 6–8.4)
RBC # BLD AUTO: 3.58 M/UL (ref 4.6–6.2)
SODIUM SERPL-SCNC: 143 MMOL/L (ref 136–145)
WBC # BLD AUTO: 9.15 K/UL (ref 3.9–12.7)

## 2020-09-09 PROCEDURE — 25000003 PHARM REV CODE 250: Performed by: INTERNAL MEDICINE

## 2020-09-09 PROCEDURE — 85025 COMPLETE CBC W/AUTO DIFF WBC: CPT

## 2020-09-09 PROCEDURE — 94761 N-INVAS EAR/PLS OXIMETRY MLT: CPT

## 2020-09-09 PROCEDURE — 83735 ASSAY OF MAGNESIUM: CPT

## 2020-09-09 PROCEDURE — 25000003 PHARM REV CODE 250: Performed by: HOSPITALIST

## 2020-09-09 PROCEDURE — 97110 THERAPEUTIC EXERCISES: CPT | Mod: CO

## 2020-09-09 PROCEDURE — 25000003 PHARM REV CODE 250: Performed by: ORTHOPAEDIC SURGERY

## 2020-09-09 PROCEDURE — 12000002 HC ACUTE/MED SURGE SEMI-PRIVATE ROOM

## 2020-09-09 PROCEDURE — 94003 VENT MGMT INPAT SUBQ DAY: CPT

## 2020-09-09 PROCEDURE — 80053 COMPREHEN METABOLIC PANEL: CPT

## 2020-09-09 PROCEDURE — 27100171 HC OXYGEN HIGH FLOW UP TO 24 HOURS

## 2020-09-09 PROCEDURE — 97116 GAIT TRAINING THERAPY: CPT | Mod: CQ

## 2020-09-09 PROCEDURE — 84100 ASSAY OF PHOSPHORUS: CPT

## 2020-09-09 PROCEDURE — 97530 THERAPEUTIC ACTIVITIES: CPT | Mod: CQ

## 2020-09-09 PROCEDURE — 99900026 HC AIRWAY MAINTENANCE (STAT)

## 2020-09-09 PROCEDURE — 36415 COLL VENOUS BLD VENIPUNCTURE: CPT

## 2020-09-09 RX ADMIN — ASPIRIN 81 MG: 81 TABLET, COATED ORAL at 08:09

## 2020-09-09 RX ADMIN — POTASSIUM CHLORIDE 40 MEQ: 1.5 POWDER, FOR SOLUTION ORAL at 01:09

## 2020-09-09 RX ADMIN — POTASSIUM & SODIUM PHOSPHATES POWDER PACK 280-160-250 MG 2 PACKET: 280-160-250 PACK at 01:09

## 2020-09-09 RX ADMIN — DOCUSATE SODIUM 100 MG: 100 CAPSULE, LIQUID FILLED ORAL at 08:09

## 2020-09-09 RX ADMIN — FAMOTIDINE 20 MG: 20 TABLET ORAL at 09:09

## 2020-09-09 RX ADMIN — SODIUM PHOSPHATE, MONOBASIC, MONOHYDRATE 20.01 MMOL: 276; 142 INJECTION, SOLUTION INTRAVENOUS at 11:09

## 2020-09-09 RX ADMIN — QUETIAPINE 25 MG: 25 TABLET ORAL at 09:09

## 2020-09-09 RX ADMIN — FAMOTIDINE 20 MG: 20 TABLET ORAL at 08:09

## 2020-09-09 RX ADMIN — ASPIRIN 81 MG: 81 TABLET, COATED ORAL at 09:09

## 2020-09-09 RX ADMIN — POLYETHYLENE GLYCOL 3350 17 G: 17 POWDER, FOR SOLUTION ORAL at 08:09

## 2020-09-09 RX ADMIN — DOCUSATE SODIUM 100 MG: 100 CAPSULE, LIQUID FILLED ORAL at 09:09

## 2020-09-09 NOTE — PLAN OF CARE
Problem: Occupational Therapy Goal  Goal: Occupational Therapy Goal  Description: Goals to be met by: 9/9/2020     Patient will increase functional independence with ADLs by performing:    Feeding with Set-up Assistance and Supervision.  Grooming while EOB with Set-up Assistance and Stand-by Assistance.  Sitting at edge of bed x10 minutes with Supervision.  Upper extremity exercise program x10 reps per handout, with assistance as needed.  Pt to adhere to posterior hip precautions during all ADL & functional mobility tasks with cues provided.      Outcome: Ongoing, Progressing   Patient is making progress. Goals remain appropriate. Continue OT plan of care.

## 2020-09-09 NOTE — PT/OT/SLP PROGRESS
Physical Therapy Treatment    Patient Name:  Kevyn Terrell Jr.   MRN:  [id number]    Recommendations:     Discharge Recommendations:  nursing facility, skilled   Discharge Equipment Recommendations: other (see comments)(TBD at the next level of care)   Barriers to discharge: None    Assessment:     Kevyn Terrell Jr. is a 89 y.o. male admitted with a medical diagnosis of Closed right hip fracture, initial encounter.  He presents with the following impairments/functional limitations:  weakness, impaired endurance, impaired self care skills, impaired functional mobilty, gait instability, decreased lower extremity function, decreased safety awareness, pain, impaired balance, orthopedic precautions. Tolerated treatment fair. Required 2 attempts to agree to mobilize, requests to stay in bed and sleep. Ambulated with rw and Mod A with KI in place, WBAT RLE.     Rehab Prognosis: Fair; patient would benefit from acute skilled PT services to address these deficits and reach maximum level of function.    Recent Surgery: Procedure(s) (LRB):  HEMIARTHROPLASTY, HIP, peg board, 1st assist (Right) 7 Days Post-Op    Plan:     During this hospitalization, patient to be seen BID to address the identified rehab impairments via gait training, therapeutic activities, therapeutic exercises and progress toward the following goals:    · Plan of Care Expires:  10/03/20    Subjective     Chief Complaint: sleepy  Patient/Family Comments/goals: none stated  Pain/Comfort:  · Pain Rating 1: other (see comments)(did not rate)  · Location - Side 1: Right  · Location - Orientation 1: generalized  · Location 1: leg(with movement)  · Pain Addressed 1: Reposition, Nurse notified      Objective:     Communicated with nurse Leggett prior to session.  Patient found supine with bed alarm, telemetry, SCD, knee immobilizer upon PT entry to room.     General Precautions: Standard, fall   Orthopedic Precautions:RLE weight bearing as tolerated, RLE posterior  precautions   Braces: Knee immobilizer     Functional Mobility:  · Bed Mobility:     · Rolling Left:  minimum assistance  · Rolling Right: minimum assistance  · Supine to Sit: moderate assistance  · Sit to Supine: moderate assistance  · Transfers:     · Sit to Stand:  maximal assistance with rolling walker  · Gait: 60' with rw and Mod A.       AM-PAC 6 CLICK MOBILITY          Therapeutic Activities and Exercises:   Transferred to sitting EOB with Mod A , slowly due to discomfort with movement.   Sat briefly before standing with rw and Max A with verbal cues for safe technique.    Sat in chair at bedside briefly. Ambulate in hallway with rw and Mod A.  Returned to chair.  LE exercises ; TKE's, hip flexion ( LLE only). AP's.   SPT chair to bed with Max A. Sit to supine with Mod A.    Patient left supine with all lines intact, call button in reach, bed alarm on and nurse Oleksandr notified..    GOALS:   Multidisciplinary Problems     Physical Therapy Goals        Problem: Physical Therapy Goal    Goal Priority Disciplines Outcome Goal Variances Interventions   Physical Therapy Goal     PT, PT/OT Ongoing, Progressing     Description: Goals to be met by: 10/3/2020     Patient will increase functional independence with mobility by performin. Supine to sit with MInimal Assistance  2. Sit to stand transfer with Minimal Assistance  3. Bed to chair transfer with Minimal Assistance using Rolling Walker  4. Gait  x 200 feet with Minimal Assistance using Rolling Walker.                      Time Tracking:     PT Received On: 20  PT Start Time: 925     PT Stop Time: 948  PT Total Time (min): 23 min     Billable Minutes: Gait Training 13min and Therapeutic Activity 10min    Treatment Type: Treatment  PT/PTA: PTA     PTA Visit Number: 3     Gale Wyatt PTA  2020

## 2020-09-09 NOTE — RESPIRATORY THERAPY
09/08/20 1956   Patient Assessment/Suction   Level of Consciousness (AVPU) alert   Respiratory Effort Unlabored   PRE-TX-O2   O2 Device (Oxygen Therapy) room air   SpO2 96 %   Pulse Oximetry Type Intermittent   $ Pulse Oximetry - Multiple Charge Pulse Oximetry - Multiple

## 2020-09-09 NOTE — PLAN OF CARE
Plan of care reviewed with pt. PT worked with pt and got in the chair today. Denies pain. Pt sleeping on and off throughout the day. Regular diet. IV phos given. Bed locked and low, call light within reach. Rounding for safety.

## 2020-09-09 NOTE — PLAN OF CARE
Plan of care reviewed with patient. SR on telemetry. R hip dressing C/D/I, Immobilizer in use. No complaints of pain during night. Potassium & phos replaced. Remains free from falls/injury. Instructed to call for assistance as needed during night. Frequent rounds made during night for safety. Bed alarm in use, call light in reach.

## 2020-09-09 NOTE — PT/OT/SLP PROGRESS
Physical Therapy Treatment    Patient Name:  Kevyn Terrell Jr.   MRN:  [id number]    Recommendations:     Discharge Recommendations:  nursing facility, skilled   Discharge Equipment Recommendations: other (see comments)(TBD at the next level of care)   Barriers to discharge: None    Assessment:     Kevyn Terrell Jr. is a 89 y.o. male admitted with a medical diagnosis of Closed right hip fracture, initial encounter.  He presents with the following impairments/functional limitations:  weakness, impaired endurance, impaired self care skills, impaired functional mobilty, gait instability, decreased lower extremity function, decreased safety awareness, orthopedic precautions . Tolerated treatment. Requires encouragement to participate in therapy session.     Rehab Prognosis: Fair; patient would benefit from acute skilled PT services to address these deficits and reach maximum level of function.    Recent Surgery: Procedure(s) (LRB):  HEMIARTHROPLASTY, HIP, peg board, 1st assist (Right) 7 Days Post-Op    Plan:     During this hospitalization, patient to be seen BID to address the identified rehab impairments via gait training, therapeutic activities, therapeutic exercises and progress toward the following goals:    · Plan of Care Expires:  10/03/20    Subjective     Chief Complaint: sleepy  Patient/Family Comments/goals: none stated  Pain/Comfort:  · Pain Rating 1: 0/10  · Location - Side 1: Right  · Location - Orientation 1: generalized  · Location 1: leg(with movement)  · Pain Addressed 1: Reposition, Nurse notified      Objective:     Communicated with nurse Leggett prior to session.  Patient found supine with bed alarm, peripheral IV, SCD, telemetry, knee immobilizer upon PT entry to room.     General Precautions: Standard, fall   Orthopedic Precautions:RLE weight bearing as tolerated, RLE posterior precautions   Braces: Knee immobilizer     Functional Mobility:  · Bed Mobility:     · Rolling Left:  minimum  assistance  · Rolling Right: minimum assistance  · Supine to Sit: moderate assistance  · Sit to Supine: moderate assistance  · Transfers:     · Sit to Stand:  moderate assistance with rolling walker  · Bed to Chair: moderate assistance with  rolling walker  using  Stand Pivot  · Gait: 120' with rw and Mod A.      AM-PAC 6 CLICK MOBILITY          Therapeutic Activities and Exercises:   Ambulated in hallway with rw and Mod A, WBAT RLE with KI in place.   Returned to to room, sat up in chair ( ate some of his lunch).   SPT chair to bed with Mod A.    Sit to supine with Mod A.    Patient left supine with all lines intact, call button in reach, bed alarm on, nurse Oleksandr notified and nsg. student present..    GOALS:   Multidisciplinary Problems     Physical Therapy Goals        Problem: Physical Therapy Goal    Goal Priority Disciplines Outcome Goal Variances Interventions   Physical Therapy Goal     PT, PT/OT Ongoing, Progressing     Description: Goals to be met by: 10/3/2020     Patient will increase functional independence with mobility by performin. Supine to sit with MInimal Assistance  2. Sit to stand transfer with Minimal Assistance  3. Bed to chair transfer with Minimal Assistance using Rolling Walker  4. Gait  x 200 feet with Minimal Assistance using Rolling Walker.                      Time Tracking:     PT Received On: 20  PT Start Time: 1400     PT Stop Time: 1425  PT Total Time (min): 25 min     Billable Minutes: Gait Training 15min and Therapeutic Activity 10min    Treatment Type: Treatment  PT/PTA: PTA     PTA Visit Number: 3     Gale Wyatt PTA  2020

## 2020-09-09 NOTE — PROGRESS NOTES
[facility] Hospital Medicine  Progress Note    Patient Name: Kevyn Terrell Jr.  MRN: [id number]  Patient Class: IP- Inpatient   Admission Date: 8/30/2020  Length of Stay: 9 days  Attending Physician: Virginie Faith MD  Primary Care Provider: Carole Rangel MD        Subjective:     Principal Problem:Closed right hip fracture, initial encounter        HPI:  Kevyn Terrell Jr. is a 89 y.o. male with a PMHx of Alzheimer's disease and metastatic cancer who presented to the ED via EMS with right hip pain following a fall earlier today.  Pt's granddaughter states that she had gone to the grocery store and when she returned, she found pt on the ground.  He had apparently fallen down 2 stairs at his trailer.  Pt is pleasantly confused and does not remember the incident.  History is obtained from the granddaughter.  Pt was recently medicated for pain and has no complaints at this time.        Overview/Hospital Course:  No notes on file    Interval History:  Patient seen and examined.  Remains pleasantly confused.  No subjective complaints.    Review of Systems   Unable to perform ROS: Dementia     Objective:     Vital Signs (Most Recent):  Temp: 99.1 °F (37.3 °C) (09/09/20 1114)  Pulse: 82 (09/09/20 1114)  Resp: 17 (09/09/20 1114)  BP: 136/63 (09/09/20 1114)  SpO2: 95 % (09/09/20 1114) Vital Signs (24h Range):  Temp:  [97.8 °F (36.6 °C)-99.1 °F (37.3 °C)] 99.1 °F (37.3 °C)  Pulse:  [] 82  Resp:  [17-20] 17  SpO2:  [93 %-96 %] 95 %  BP: (130-181)/(62-78) 136/63     Weight: 58.3 kg (128 lb 8.5 oz)  Body mass index is 19.54 kg/m².    Intake/Output Summary (Last 24 hours) at 9/9/2020 1413  Last data filed at 9/9/2020 0644  Gross per 24 hour   Intake 720 ml   Output --   Net 720 ml      Physical Exam  Vitals signs and nursing note reviewed.   Constitutional:       General: He is not in acute distress.     Appearance: He is well-developed.   HENT:      Head: Normocephalic and atraumatic.       Mouth/Throat:      Mouth: Mucous membranes are moist.   Eyes:      Conjunctiva/sclera: Conjunctivae normal.      Pupils: Pupils are equal, round, and reactive to light.   Neck:      Musculoskeletal: Normal range of motion and neck supple.   Cardiovascular:      Rate and Rhythm: Normal rate and regular rhythm.      Heart sounds: Normal heart sounds.   Pulmonary:      Effort: Pulmonary effort is normal.      Breath sounds: Normal breath sounds.   Abdominal:      General: Bowel sounds are normal.      Palpations: Abdomen is soft.   Musculoskeletal:         General: Tenderness (right hip) present.      Comments: Surgical bandage clean/dry/intact   Skin:     General: Skin is warm and dry.   Neurological:      Mental Status: He is alert. He is confused.      Comments: Oriented to person.     Psychiatric:         Mood and Affect: Mood normal.         Behavior: Behavior normal.         Significant Labs: All pertinent labs within the past 24 hours have been reviewed.    Significant Imaging: I have reviewed and interpreted all pertinent imaging results/findings within the past 24 hours.      Assessment/Plan:      * Closed right hip fracture, initial encounter  Right hip x-ray reviewed  Status post right hip MRI arthroplasty with Dr. Porter  PT OT following.   for dc planning- current plan is home with re-initiation of hospice tomorrow  Pain control        Hypophosphatemia  On replacement protocol.      Alzheimer's dementia without behavioral disturbance  Chronic.  Fall and delirium precautions.  1) During the day, please open the shades and turn on the lights- If patient is in private room, please make sure they are close to the window.    2) Make sure the correct date and time is written on the whiteboard, as well as the current care team  3) Minimize interruptions at night-time, close shades and turn off TV.   4) When possible, during daytime make sure patient is in chair and provide activities that engage  patient.  5) Please make sure patient has hearing aids and glasses while awake.        Prostate cancer metastatic to bone  Followed by Hematology/Oncology at [facility].      Hypokalemia  Patient has hypokalemia which is currently controlled. Last electrolytes reviewed-   Recent Labs   Lab 09/03/20  0444 09/04/20  0538   K 4.0 3.8   . Will replace potassium and monitor electrolytes closely. Continuous telemetry.    Hypocalcemia  Replacement ordered.        VTE Risk Mitigation (From admission, onward)         Ordered     Place sequential compression device  Until discontinued      09/02/20 1613     Place DONAL hose  Until discontinued      09/02/20 1613     IP VTE HIGH RISK PATIENT  Once      09/02/20 1613                Discharge Planning   BREANNE: 9/10/2020     Code Status: DNR   Is the patient medically ready for discharge?:     Reason for patient still in hospital (select all that apply): Patient trending condition  Discharge Plan A: SNF     Virginie Faith MD  Department of Hospital Medicine   [facility]

## 2020-09-09 NOTE — PLAN OF CARE
[facility]---9/9/2020 9:40:41 AM Note: Checking on auth.  Kimberlyn [email or web address]  9/9/2020 9:37:01 AM Note: AVS attached...do you have auth? can you accept today?  Gloria Johnson

## 2020-09-09 NOTE — PT/OT/SLP PROGRESS
"Occupational Therapy   Treatment    Name: Kevyn Terrell Jr.  MRN: [id number]  Admitting Diagnosis:  Closed right hip fracture, initial encounter  7 Days Post-Op    Recommendations:     Discharge Recommendations: nursing facility, skilled  Discharge Equipment Recommendations:  bath bench  Barriers to discharge:  None, Decreased caregiver support    Assessment:     Kevyn Terrell Jr. is a 89 y.o. male with a medical diagnosis of Closed right hip fracture, initial encounter.  He presents with resistance to sitting at EOB or getting OOB, despite maximum encouragement. He was reluctantly agreeable to B UE exercises with HOB raised. Patient was jovial at times and resistant at others, stating he wished to be covered up and "let me go to sleep". Continue OT treatment to maximize safety & independence with ADLs.h . Performance deficits affecting function are weakness, impaired endurance, impaired self care skills, impaired functional mobilty, decreased lower extremity function, decreased safety awareness, orthopedic precautions.     Rehab Prognosis:  Good; patient would benefit from acute skilled OT services to address these deficits and reach maximum level of function.       Plan:     Patient to be seen 4 x/week to address the above listed problems via self-care/home management, therapeutic activities, therapeutic exercises  · Plan of Care Expires: 09/15/20  · Plan of Care Reviewed with: patient    Subjective     Pain/Comfort:  · Pain Rating 1: (None stated or observed.)    Objective:     Communicated with: RNOleksandr prior to session.  Patient found HOB elevated with bed alarm, peripheral IV, SCD, telemetry, knee immobilizer upon OT entry to room.    General Precautions: Standard, fall   Orthopedic Precautions:RLE weight bearing as tolerated, RLE posterior precautions   Braces: Knee immobilizer     Occupational Performance:     Bed Mobility:      Functional Mobility/Transfers:    Activities of Daily Living:  Pt declines " at this time, but he did ask for us to come back tomorrow.    [facility] 6 Click ADL: 14    Treatment & Education:  - SOTA demoed UE exercises and pt returned demo while HOB raised.    Patient left HOB elevated with all lines intact, call button in reach and bed alarm onEducation:      GOALS:   Multidisciplinary Problems     Occupational Therapy Goals        Problem: Occupational Therapy Goal    Goal Priority Disciplines Outcome Interventions   Occupational Therapy Goal     OT, PT/OT Ongoing, Progressing    Description: Goals to be met by: 9/9/2020     Patient will increase functional independence with ADLs by performing:    Feeding with Set-up Assistance and Supervision.  Grooming while EOB with Set-up Assistance and Stand-by Assistance.  Sitting at edge of bed x10 minutes with Supervision.  Upper extremity exercise program x10 reps per handout, with assistance as needed.  Pt to adhere to posterior hip precautions during all ADL & functional mobility tasks with cues provided.                       Time Tracking:     OT Date of Treatment: 09/09/20  OT Start Time: 1537  OT Stop Time: 1551  OT Total Time (min): 14 min    Billable Minutes:Therapeutic Exercise 14 April DELFINA Mensah  9/9/2020

## 2020-09-09 NOTE — PLAN OF CARE
[facility]---9/9/2020 9:40:41 AM Note: Checking on auth.  [email or web address]  · 9/9/2020 9:37:01 AM Note: AVS attached...do you have auth? can you accept today?  Gloria Johnson    · 10:35am SW spoke to patient's grand-daughter Maricruz [phone] regarding [facility] acceptance.  KAMRYN explained that patient may/may not received private room.  Maricruz verbalized understanding.  KAMRYN left voice message for return call from [employer] regarding SNF auth. JOANNA Agarwal    · 9/9/2020 10:36:07 AM Note: Auth is still pending. Hopefully we will get auth today.  [email or web address]       09/09/20 0937   Post-Acute Status   Post-Acute Authorization Placement   Post-Acute Placement Status Pending Payor Review

## 2020-09-09 NOTE — PLAN OF CARE
Problem: Physical Therapy Goal  Goal: Physical Therapy Goal  Description: Goals to be met by: 10/3/2020     Patient will increase functional independence with mobility by performin. Supine to sit with MInimal Assistance  2. Sit to stand transfer with Minimal Assistance  3. Bed to chair transfer with Minimal Assistance using Rolling Walker  4. Gait  x 200 feet with Minimal Assistance using Rolling Walker.     Outcome: Ongoing, Progressing   Therapeutic activity : bed mobility, transfers, gait with rw and Mod A with KI in place.

## 2020-09-09 NOTE — PLAN OF CARE
09/09/20 0806   PRE-TX-O2   O2 Device (Oxygen Therapy) room air   SpO2 95 %   Pulse Oximetry Type Intermittent   $ Pulse Oximetry - Multiple Charge Pulse Oximetry - Multiple

## 2020-09-10 LAB
ALBUMIN SERPL BCP-MCNC: 1.9 G/DL (ref 3.5–5.2)
ALP SERPL-CCNC: 347 U/L (ref 55–135)
ALT SERPL W/O P-5'-P-CCNC: 7 U/L (ref 10–44)
ANION GAP SERPL CALC-SCNC: 9 MMOL/L (ref 8–16)
AST SERPL-CCNC: 19 U/L (ref 10–40)
BASOPHILS # BLD AUTO: 0.07 K/UL (ref 0–0.2)
BASOPHILS NFR BLD: 0.7 % (ref 0–1.9)
BILIRUB SERPL-MCNC: 0.5 MG/DL (ref 0.1–1)
BUN SERPL-MCNC: 8 MG/DL (ref 8–23)
CALCIUM SERPL-MCNC: 7.6 MG/DL (ref 8.7–10.5)
CHLORIDE SERPL-SCNC: 108 MMOL/L (ref 95–110)
CO2 SERPL-SCNC: 23 MMOL/L (ref 23–29)
CREAT SERPL-MCNC: 0.6 MG/DL (ref 0.5–1.4)
DIFFERENTIAL METHOD: ABNORMAL
EOSINOPHIL # BLD AUTO: 0.3 K/UL (ref 0–0.5)
EOSINOPHIL NFR BLD: 3.3 % (ref 0–8)
ERYTHROCYTE [DISTWIDTH] IN BLOOD BY AUTOMATED COUNT: 14.7 % (ref 11.5–14.5)
EST. GFR  (AFRICAN AMERICAN): >60 ML/MIN/1.73 M^2
EST. GFR  (NON AFRICAN AMERICAN): >60 ML/MIN/1.73 M^2
FINAL PATHOLOGIC DIAGNOSIS: NORMAL
GLUCOSE SERPL-MCNC: 77 MG/DL (ref 70–110)
GROSS: NORMAL
HCT VFR BLD AUTO: 28.5 % (ref 40–54)
HGB BLD-MCNC: 9 G/DL (ref 14–18)
IMM GRANULOCYTES # BLD AUTO: 0.07 K/UL (ref 0–0.04)
IMM GRANULOCYTES NFR BLD AUTO: 0.7 % (ref 0–0.5)
LYMPHOCYTES # BLD AUTO: 2.2 K/UL (ref 1–4.8)
LYMPHOCYTES NFR BLD: 22.1 % (ref 18–48)
MAGNESIUM SERPL-MCNC: 2 MG/DL (ref 1.6–2.6)
MCH RBC QN AUTO: 28 PG (ref 27–31)
MCHC RBC AUTO-ENTMCNC: 31.6 G/DL (ref 32–36)
MCV RBC AUTO: 89 FL (ref 82–98)
MONOCYTES # BLD AUTO: 1.2 K/UL (ref 0.3–1)
MONOCYTES NFR BLD: 11.6 % (ref 4–15)
NEUTROPHILS # BLD AUTO: 6.2 K/UL (ref 1.8–7.7)
NEUTROPHILS NFR BLD: 61.6 % (ref 38–73)
NRBC BLD-RTO: 0 /100 WBC
PHOSPHATE SERPL-MCNC: 2.4 MG/DL (ref 2.7–4.5)
PLATELET # BLD AUTO: 347 K/UL (ref 150–350)
PMV BLD AUTO: 9.4 FL (ref 9.2–12.9)
POTASSIUM SERPL-SCNC: 3.7 MMOL/L (ref 3.5–5.1)
PROT SERPL-MCNC: 5.1 G/DL (ref 6–8.4)
RBC # BLD AUTO: 3.22 M/UL (ref 4.6–6.2)
SARS-COV-2 RNA RESP QL NAA+PROBE: NOT DETECTED
SODIUM SERPL-SCNC: 140 MMOL/L (ref 136–145)
WBC # BLD AUTO: 10 K/UL (ref 3.9–12.7)

## 2020-09-10 PROCEDURE — 25000003 PHARM REV CODE 250: Performed by: ORTHOPAEDIC SURGERY

## 2020-09-10 PROCEDURE — 12000002 HC ACUTE/MED SURGE SEMI-PRIVATE ROOM

## 2020-09-10 PROCEDURE — 80053 COMPREHEN METABOLIC PANEL: CPT

## 2020-09-10 PROCEDURE — 97116 GAIT TRAINING THERAPY: CPT | Mod: CQ

## 2020-09-10 PROCEDURE — 36415 COLL VENOUS BLD VENIPUNCTURE: CPT

## 2020-09-10 PROCEDURE — 25000003 PHARM REV CODE 250: Performed by: HOSPITALIST

## 2020-09-10 PROCEDURE — 85025 COMPLETE CBC W/AUTO DIFF WBC: CPT

## 2020-09-10 PROCEDURE — 25000003 PHARM REV CODE 250: Performed by: INTERNAL MEDICINE

## 2020-09-10 PROCEDURE — 83735 ASSAY OF MAGNESIUM: CPT

## 2020-09-10 PROCEDURE — U0003 INFECTIOUS AGENT DETECTION BY NUCLEIC ACID (DNA OR RNA); SEVERE ACUTE RESPIRATORY SYNDROME CORONAVIRUS 2 (SARS-COV-2) (CORONAVIRUS DISEASE [COVID-19]), AMPLIFIED PROBE TECHNIQUE, MAKING USE OF HIGH THROUGHPUT TECHNOLOGIES AS DESCRIBED BY CMS-2020-01-R: HCPCS

## 2020-09-10 PROCEDURE — 84100 ASSAY OF PHOSPHORUS: CPT

## 2020-09-10 PROCEDURE — 94761 N-INVAS EAR/PLS OXIMETRY MLT: CPT

## 2020-09-10 RX ADMIN — QUETIAPINE 25 MG: 25 TABLET ORAL at 08:09

## 2020-09-10 RX ADMIN — ASPIRIN 81 MG: 81 TABLET, COATED ORAL at 08:09

## 2020-09-10 RX ADMIN — POLYETHYLENE GLYCOL 3350 17 G: 17 POWDER, FOR SOLUTION ORAL at 08:09

## 2020-09-10 RX ADMIN — ACETAMINOPHEN 650 MG: 325 TABLET ORAL at 10:09

## 2020-09-10 RX ADMIN — POTASSIUM & SODIUM PHOSPHATES POWDER PACK 280-160-250 MG 2 PACKET: 280-160-250 PACK at 08:09

## 2020-09-10 RX ADMIN — FAMOTIDINE 20 MG: 20 TABLET ORAL at 08:09

## 2020-09-10 RX ADMIN — DOCUSATE SODIUM 100 MG: 100 CAPSULE, LIQUID FILLED ORAL at 08:09

## 2020-09-10 NOTE — PLAN OF CARE
Problem: Physical Therapy Goal  Goal: Physical Therapy Goal  Description: Goals to be met by: 10/3/2020     Patient will increase functional independence with mobility by performin. Supine to sit with MInimal Assistance  2. Sit to stand transfer with Minimal Assistance  3. Bed to chair transfer with Minimal Assistance using Rolling Walker  4. Gait  x 200 feet with Minimal Assistance using Rolling Walker.     Outcome: Ongoing, Progressing   Ambulate with rw and assistance , WBAT RLE with KI in place.

## 2020-09-10 NOTE — PT/OT/SLP PROGRESS
Physical Therapy Treatment    Patient Name:  Kevyn Terrell Jr.   MRN:  [id number]    Recommendations:     Discharge Recommendations:  nursing facility, skilled   Discharge Equipment Recommendations: other (see comments)(TBD at the next level of care)   Barriers to discharge: None    Assessment:     Kevyn Terrell Jr. is a 89 y.o. male admitted with a medical diagnosis of Closed right hip fracture, initial encounter.  He presents with the following impairments/functional limitations:  weakness, impaired endurance, impaired self care skills, impaired functional mobilty, gait instability, decreased lower extremity function, decreased safety awareness, orthopedic precautions . Tolerated treatment. Increased ambulation distance noted this session with rw and Min A, WBAT RLE with KI in place.    Rehab Prognosis: Fair; patient would benefit from acute skilled PT services to address these deficits and reach maximum level of function.    Recent Surgery: Procedure(s) (LRB):  HEMIARTHROPLASTY, HIP, peg board, 1st assist (Right) 8 Days Post-Op    Plan:     During this hospitalization, patient to be seen BID to address the identified rehab impairments via gait training, therapeutic activities, therapeutic exercises and progress toward the following goals:    · Plan of Care Expires:  10/03/20    Subjective     Chief Complaint: none stated  Patient/Family Comments/goals: none stated  Pain/Comfort:  · Pain Rating 1: 0/10      Objective:     Communicated with nurse Leggett prior to session.  Patient found supine with bed alarm, knee immobilizer, telemetry, SCD upon PT entry to room.     General Precautions: Standard, fall   Orthopedic Precautions:RLE weight bearing as tolerated, RLE posterior precautions   Braces: Knee immobilizer     Functional Mobility:  · Bed Mobility:     · Rolling Left:  minimum assistance  · Rolling Right: minimum assistance  · Supine to Sit: minimum assistance  · Sit to Supine: minimum  assistance  · Transfers:     · Sit to Stand:  moderate assistance with rolling walker  · Gait: 250' with rw and Min A, WBAT RLE with KI in place.      AM-PAC 6 CLICK MOBILITY          Therapeutic Activities and Exercises:   Ambulated in hallway with assistance for safety.     Patient left supine with all lines intact, call button in reach, bed alarm on, nurse Oleksandr notified and granddaughter present..    GOALS:   Multidisciplinary Problems     Physical Therapy Goals        Problem: Physical Therapy Goal    Goal Priority Disciplines Outcome Goal Variances Interventions   Physical Therapy Goal     PT, PT/OT Ongoing, Progressing     Description: Goals to be met by: 10/3/2020     Patient will increase functional independence with mobility by performin. Supine to sit with MInimal Assistance  2. Sit to stand transfer with Minimal Assistance  3. Bed to chair transfer with Minimal Assistance using Rolling Walker  4. Gait  x 200 feet with Minimal Assistance using Rolling Walker.                      Time Tracking:     PT Received On: 09/10/20  PT Start Time: 1330     PT Stop Time: 1345  PT Total Time (min): 15 min     Billable Minutes: Gait Training 15min    Treatment Type: Treatment  PT/PTA: PTA     PTA Visit Number: 4     Gale Wyatt PTA  09/10/2020

## 2020-09-10 NOTE — PLAN OF CARE
Plan of care continues. Waiting SNF placement. Covid screen done this morning. Ambulating with assist. No pain. Poor appetite. Dsg cdi. Bed locked with alarm in place. Rounding for safety.

## 2020-09-10 NOTE — SUBJECTIVE & OBJECTIVE
Interval History:  Patient seen and examined.  Remains pleasantly confused.  No subjective complaints.    Review of Systems   Unable to perform ROS: Dementia     Objective:     Vital Signs (Most Recent):  Temp: 97.7 °F (36.5 °C) (09/10/20 0454)  Pulse: 87 (09/10/20 0454)  Resp: 18 (09/10/20 0454)  BP: (!) 121/59 (09/10/20 0454)  SpO2: 95 % (09/10/20 0454) Vital Signs (24h Range):  Temp:  [97.7 °F (36.5 °C)-99.1 °F (37.3 °C)] 97.7 °F (36.5 °C)  Pulse:  [42-89] 87  Resp:  [17-18] 18  SpO2:  [92 %-97 %] 95 %  BP: ()/(56-64) 121/59     Weight: 58.3 kg (128 lb 8.5 oz)  Body mass index is 19.54 kg/m².    Intake/Output Summary (Last 24 hours) at 9/10/2020 1006  Last data filed at 9/10/2020 0600  Gross per 24 hour   Intake 1000 ml   Output 3 ml   Net 997 ml      Physical Exam  Vitals signs and nursing note reviewed.   Constitutional:       General: He is not in acute distress.     Appearance: He is well-developed.   HENT:      Head: Normocephalic and atraumatic.      Mouth/Throat:      Mouth: Mucous membranes are moist.   Eyes:      Conjunctiva/sclera: Conjunctivae normal.      Pupils: Pupils are equal, round, and reactive to light.   Neck:      Musculoskeletal: Normal range of motion and neck supple.   Cardiovascular:      Rate and Rhythm: Normal rate and regular rhythm.      Heart sounds: Normal heart sounds.   Pulmonary:      Effort: Pulmonary effort is normal.      Breath sounds: Normal breath sounds.   Abdominal:      General: Bowel sounds are normal.      Palpations: Abdomen is soft.   Musculoskeletal:         General: Tenderness (right hip) present.      Comments: Surgical bandage clean/dry/intact   Skin:     General: Skin is warm and dry.   Neurological:      Mental Status: He is alert. He is confused.      Comments: Oriented to person.     Psychiatric:         Mood and Affect: Mood normal.         Behavior: Behavior normal.         Significant Labs: All pertinent labs within the past 24 hours have been  reviewed.    Significant Imaging: I have reviewed and interpreted all pertinent imaging results/findings within the past 24 hours.

## 2020-09-10 NOTE — PROGRESS NOTES
[facility] Hospital Medicine  Progress Note    Patient Name: Kevyn Terrell Jr.  MRN: [id number]  Patient Class: IP- Inpatient   Admission Date: 8/30/2020  Length of Stay: 10 days  Attending Physician: Virginie Faith MD  Primary Care Provider: Carole Rangel MD        Subjective:     Principal Problem:Closed right hip fracture, initial encounter        HPI:  Kevyn Terrell Jr. is a 89 y.o. male with a PMHx of Alzheimer's disease and metastatic cancer who presented to the ED via EMS with right hip pain following a fall earlier today.  Pt's granddaughter states that she had gone to the grocery store and when she returned, she found pt on the ground.  He had apparently fallen down 2 stairs at his trailer.  Pt is pleasantly confused and does not remember the incident.  History is obtained from the granddaughter.  Pt was recently medicated for pain and has no complaints at this time.        Overview/Hospital Course:  No notes on file    Interval History:  Patient seen and examined.  Remains pleasantly confused.  No subjective complaints.    Review of Systems   Unable to perform ROS: Dementia     Objective:     Vital Signs (Most Recent):  Temp: 97.7 °F (36.5 °C) (09/10/20 0454)  Pulse: 87 (09/10/20 0454)  Resp: 18 (09/10/20 0454)  BP: (!) 121/59 (09/10/20 0454)  SpO2: 95 % (09/10/20 0454) Vital Signs (24h Range):  Temp:  [97.7 °F (36.5 °C)-99.1 °F (37.3 °C)] 97.7 °F (36.5 °C)  Pulse:  [42-89] 87  Resp:  [17-18] 18  SpO2:  [92 %-97 %] 95 %  BP: ()/(56-64) 121/59     Weight: 58.3 kg (128 lb 8.5 oz)  Body mass index is 19.54 kg/m².    Intake/Output Summary (Last 24 hours) at 9/10/2020 1006  Last data filed at 9/10/2020 0600  Gross per 24 hour   Intake 1000 ml   Output 3 ml   Net 997 ml      Physical Exam  Vitals signs and nursing note reviewed.   Constitutional:       General: He is not in acute distress.     Appearance: He is well-developed.   HENT:      Head: Normocephalic and atraumatic.       Mouth/Throat:      Mouth: Mucous membranes are moist.   Eyes:      Conjunctiva/sclera: Conjunctivae normal.      Pupils: Pupils are equal, round, and reactive to light.   Neck:      Musculoskeletal: Normal range of motion and neck supple.   Cardiovascular:      Rate and Rhythm: Normal rate and regular rhythm.      Heart sounds: Normal heart sounds.   Pulmonary:      Effort: Pulmonary effort is normal.      Breath sounds: Normal breath sounds.   Abdominal:      General: Bowel sounds are normal.      Palpations: Abdomen is soft.   Musculoskeletal:         General: Tenderness (right hip) present.      Comments: Surgical bandage clean/dry/intact   Skin:     General: Skin is warm and dry.   Neurological:      Mental Status: He is alert. He is confused.      Comments: Oriented to person.     Psychiatric:         Mood and Affect: Mood normal.         Behavior: Behavior normal.         Significant Labs: All pertinent labs within the past 24 hours have been reviewed.    Significant Imaging: I have reviewed and interpreted all pertinent imaging results/findings within the past 24 hours.      Assessment/Plan:      * Closed right hip fracture, initial encounter  Right hip x-ray reviewed  Status post right hip MRI arthroplasty with Dr. Porter  PT OT following.   for dc planning- current plan is home with re-initiation of hospice tomorrow  Pain control        Hypophosphatemia  On replacement protocol.      Alzheimer's dementia without behavioral disturbance  Chronic.  Fall and delirium precautions.  1) During the day, please open the shades and turn on the lights- If patient is in private room, please make sure they are close to the window.    2) Make sure the correct date and time is written on the whiteboard, as well as the current care team  3) Minimize interruptions at night-time, close shades and turn off TV.   4) When possible, during daytime make sure patient is in chair and provide activities that engage  patient.  5) Please make sure patient has hearing aids and glasses while awake.        Prostate cancer metastatic to bone  Followed by Hematology/Oncology at [facility].      Hypokalemia  Patient has hypokalemia which is currently controlled. Last electrolytes reviewed-   Recent Labs   Lab 09/03/20  0444 09/04/20  0538   K 4.0 3.8   . Will replace potassium and monitor electrolytes closely. Continuous telemetry.    Hypocalcemia  Replacement ordered.        VTE Risk Mitigation (From admission, onward)         Ordered     Place sequential compression device  Until discontinued      09/02/20 1613     Place DONAL hose  Until discontinued      09/02/20 1613     IP VTE HIGH RISK PATIENT  Once      09/02/20 1613                Discharge Planning   BREANNE: 9/11/2020     Code Status: DNR   Is the patient medically ready for discharge?:     Reason for patient still in hospital (select all that apply): Pending disposition  Discharge Plan A: Hospice/home                  Virginie Faith MD  Department of Hospital Medicine   [facility]

## 2020-09-10 NOTE — PT/OT/SLP PROGRESS
Physical Therapy Treatment    Patient Name:  Kevyn Terrell Jr.   MRN:  [id number]    Recommendations:     Discharge Recommendations:  nursing facility, skilled   Discharge Equipment Recommendations: other (see comments)(TBD at the next level of care)   Barriers to discharge: None    Assessment:     Kevyn Terrell Jr. is a 89 y.o. male admitted with a medical diagnosis of Closed right hip fracture, initial encounter.  He presents with the following impairments/functional limitations:  weakness, impaired endurance, impaired self care skills, impaired functional mobilty, gait instability, impaired balance, decreased lower extremity function, decreased safety awareness, orthopedic precautions . Tolerated treatment. Requires assistance for safety with mobility and encouragement to participate. Reports feeling tired and wants to sleep all the time. Ambulated in hallway with rw and Mod A with KI in place.     Rehab Prognosis: Fair; patient would benefit from acute skilled PT services to address these deficits and reach maximum level of function.    Recent Surgery: Procedure(s) (LRB):  HEMIARTHROPLASTY, HIP, peg board, 1st assist (Right) 8 Days Post-Op    Plan:     During this hospitalization, patient to be seen BID to address the identified rehab impairments via gait training, therapeutic activities, therapeutic exercises and progress toward the following goals:    · Plan of Care Expires:  10/03/20    Subjective     Chief Complaint: feeling tired  Patient/Family Comments/goals: none stated  Pain/Comfort:  · Pain Rating 1: 0/10      Objective:     Communicated with nurse Leggett prior to session.  Patient found supine with bed alarm, telemetry, SCD, knee immobilizer upon PT entry to room.     General Precautions: Standard, fall   Orthopedic Precautions:RLE weight bearing as tolerated, RLE posterior precautions   Braces: Knee immobilizer     Functional Mobility:  · Bed Mobility:     · Rolling Left:  minimum  assistance  · Rolling Right: minimum assistance  · Supine to Sit: minimum assistance  · Sit to Supine: minimum assistance  · Transfers:     · Sit to Stand:  maximal assistance with rolling walker  · Gait: 150' with rw and Mod A, WBAT RLE with KI in place.       AM-PAC 6 CLICK MOBILITY          Therapeutic Activities and Exercises:   Ambulated slowly in hallway with rw and Mod A.   Returned to bed with Min A.    Patient left supine with all lines intact, call button in reach, bed alarm off and nurse Oleksandr notified..    GOALS:   Multidisciplinary Problems     Physical Therapy Goals        Problem: Physical Therapy Goal    Goal Priority Disciplines Outcome Goal Variances Interventions   Physical Therapy Goal     PT, PT/OT Ongoing, Progressing     Description: Goals to be met by: 10/3/2020     Patient will increase functional independence with mobility by performin. Supine to sit with MInimal Assistance  2. Sit to stand transfer with Minimal Assistance  3. Bed to chair transfer with Minimal Assistance using Rolling Walker  4. Gait  x 200 feet with Minimal Assistance using Rolling Walker.                      Time Tracking:     PT Received On: 09/10/20  PT Start Time: 0815     PT Stop Time: 08  PT Total Time (min): 16 min     Billable Minutes: Gait Training 16min    Treatment Type: Treatment  PT/PTA: PTA     PTA Visit Number: 4     Gale Wyatt PTA  09/10/2020

## 2020-09-10 NOTE — PLAN OF CARE
Patient disoriented to time and situation.  VSS, afebrile.  Tele # [id number] monitored; SR-SB.   Dressing to R hip CDI.  Denies pain.  Bandage to L forearm skin tear CDI.  ASA/SCDs for VTE prevention.  Brief in place for incontinence.  No new complaints noted at this time.  Needs addressed, safety maintained.  Monitoring.

## 2020-09-11 VITALS
TEMPERATURE: 98 F | RESPIRATION RATE: 16 BRPM | HEART RATE: 90 BPM | OXYGEN SATURATION: 95 % | BODY MASS INDEX: 19.48 KG/M2 | WEIGHT: 128.5 LBS | SYSTOLIC BLOOD PRESSURE: 144 MMHG | HEIGHT: 68 IN | DIASTOLIC BLOOD PRESSURE: 67 MMHG

## 2020-09-11 LAB
ALBUMIN SERPL BCP-MCNC: 1.9 G/DL (ref 3.5–5.2)
ALP SERPL-CCNC: 322 U/L (ref 55–135)
ALT SERPL W/O P-5'-P-CCNC: 7 U/L (ref 10–44)
ANION GAP SERPL CALC-SCNC: 7 MMOL/L (ref 8–16)
AST SERPL-CCNC: 14 U/L (ref 10–40)
BACTERIA #/AREA URNS HPF: ABNORMAL /HPF
BASOPHILS # BLD AUTO: 0.08 K/UL (ref 0–0.2)
BASOPHILS NFR BLD: 0.8 % (ref 0–1.9)
BILIRUB SERPL-MCNC: 0.2 MG/DL (ref 0.1–1)
BILIRUB UR QL STRIP: NEGATIVE
BUN SERPL-MCNC: 10 MG/DL (ref 8–23)
CALCIUM SERPL-MCNC: 7.5 MG/DL (ref 8.7–10.5)
CHLORIDE SERPL-SCNC: 109 MMOL/L (ref 95–110)
CLARITY UR: CLEAR
CO2 SERPL-SCNC: 25 MMOL/L (ref 23–29)
COLOR UR: YELLOW
CREAT SERPL-MCNC: 0.6 MG/DL (ref 0.5–1.4)
DIFFERENTIAL METHOD: ABNORMAL
EOSINOPHIL # BLD AUTO: 0.5 K/UL (ref 0–0.5)
EOSINOPHIL NFR BLD: 4.6 % (ref 0–8)
ERYTHROCYTE [DISTWIDTH] IN BLOOD BY AUTOMATED COUNT: 15.1 % (ref 11.5–14.5)
EST. GFR  (AFRICAN AMERICAN): >60 ML/MIN/1.73 M^2
EST. GFR  (NON AFRICAN AMERICAN): >60 ML/MIN/1.73 M^2
GLUCOSE SERPL-MCNC: 104 MG/DL (ref 70–110)
GLUCOSE UR QL STRIP: NEGATIVE
HCT VFR BLD AUTO: 30 % (ref 40–54)
HGB BLD-MCNC: 9.1 G/DL (ref 14–18)
HGB UR QL STRIP: ABNORMAL
IMM GRANULOCYTES # BLD AUTO: 0.08 K/UL (ref 0–0.04)
IMM GRANULOCYTES NFR BLD AUTO: 0.8 % (ref 0–0.5)
KETONES UR QL STRIP: NEGATIVE
LEUKOCYTE ESTERASE UR QL STRIP: NEGATIVE
LYMPHOCYTES # BLD AUTO: 2.9 K/UL (ref 1–4.8)
LYMPHOCYTES NFR BLD: 27.6 % (ref 18–48)
MAGNESIUM SERPL-MCNC: 2.1 MG/DL (ref 1.6–2.6)
MCH RBC QN AUTO: 26.5 PG (ref 27–31)
MCHC RBC AUTO-ENTMCNC: 30.3 G/DL (ref 32–36)
MCV RBC AUTO: 88 FL (ref 82–98)
MICROSCOPIC COMMENT: ABNORMAL
MONOCYTES # BLD AUTO: 1.1 K/UL (ref 0.3–1)
MONOCYTES NFR BLD: 10.4 % (ref 4–15)
NEUTROPHILS # BLD AUTO: 5.8 K/UL (ref 1.8–7.7)
NEUTROPHILS NFR BLD: 55.8 % (ref 38–73)
NITRITE UR QL STRIP: NEGATIVE
NRBC BLD-RTO: 0 /100 WBC
PH UR STRIP: 8 [PH] (ref 5–8)
PHOSPHATE SERPL-MCNC: 1.8 MG/DL (ref 2.7–4.5)
PLATELET # BLD AUTO: 388 K/UL (ref 150–350)
PMV BLD AUTO: 9.2 FL (ref 9.2–12.9)
POTASSIUM SERPL-SCNC: 4 MMOL/L (ref 3.5–5.1)
PROT SERPL-MCNC: 4.9 G/DL (ref 6–8.4)
PROT UR QL STRIP: NEGATIVE
RBC # BLD AUTO: 3.43 M/UL (ref 4.6–6.2)
RBC #/AREA URNS HPF: 5 /HPF (ref 0–4)
SODIUM SERPL-SCNC: 141 MMOL/L (ref 136–145)
SP GR UR STRIP: 1.01 (ref 1–1.03)
URN SPEC COLLECT METH UR: ABNORMAL
UROBILINOGEN UR STRIP-ACNC: >=8 EU/DL
WBC # BLD AUTO: 10.36 K/UL (ref 3.9–12.7)
WBC #/AREA URNS HPF: 1 /HPF (ref 0–5)

## 2020-09-11 PROCEDURE — 97803 MED NUTRITION INDIV SUBSEQ: CPT

## 2020-09-11 PROCEDURE — 80053 COMPREHEN METABOLIC PANEL: CPT

## 2020-09-11 PROCEDURE — 97530 THERAPEUTIC ACTIVITIES: CPT | Mod: CQ

## 2020-09-11 PROCEDURE — 84100 ASSAY OF PHOSPHORUS: CPT

## 2020-09-11 PROCEDURE — 81000 URINALYSIS NONAUTO W/SCOPE: CPT

## 2020-09-11 PROCEDURE — 83735 ASSAY OF MAGNESIUM: CPT

## 2020-09-11 PROCEDURE — 30200315 PPD INTRADERMAL TEST REV CODE 302: Performed by: INTERNAL MEDICINE

## 2020-09-11 PROCEDURE — 97116 GAIT TRAINING THERAPY: CPT | Mod: CQ

## 2020-09-11 PROCEDURE — 94761 N-INVAS EAR/PLS OXIMETRY MLT: CPT

## 2020-09-11 PROCEDURE — 25000003 PHARM REV CODE 250: Performed by: ORTHOPAEDIC SURGERY

## 2020-09-11 PROCEDURE — 36415 COLL VENOUS BLD VENIPUNCTURE: CPT

## 2020-09-11 PROCEDURE — 94760 N-INVAS EAR/PLS OXIMETRY 1: CPT

## 2020-09-11 PROCEDURE — 25000003 PHARM REV CODE 250: Performed by: HOSPITALIST

## 2020-09-11 PROCEDURE — 85025 COMPLETE CBC W/AUTO DIFF WBC: CPT

## 2020-09-11 PROCEDURE — 86580 TB INTRADERMAL TEST: CPT | Performed by: INTERNAL MEDICINE

## 2020-09-11 PROCEDURE — 25000003 PHARM REV CODE 250: Performed by: INTERNAL MEDICINE

## 2020-09-11 RX ORDER — HYDROCODONE BITARTRATE AND ACETAMINOPHEN 5; 325 MG/1; MG/1
1 TABLET ORAL EVERY 6 HOURS PRN
Qty: 10 TABLET | Refills: 0 | Status: SHIPPED | OUTPATIENT
Start: 2020-09-11

## 2020-09-11 RX ORDER — HYDROCODONE BITARTRATE AND ACETAMINOPHEN 5; 325 MG/1; MG/1
1 TABLET ORAL EVERY 6 HOURS PRN
Qty: 10 TABLET | Refills: 0
Start: 2020-09-11 | End: 2020-09-11 | Stop reason: SDUPTHER

## 2020-09-11 RX ADMIN — DOCUSATE SODIUM 100 MG: 100 CAPSULE, LIQUID FILLED ORAL at 08:09

## 2020-09-11 RX ADMIN — SODIUM PHOSPHATE, MONOBASIC, MONOHYDRATE 20.01 MMOL: 276; 142 INJECTION, SOLUTION INTRAVENOUS at 09:09

## 2020-09-11 RX ADMIN — TUBERCULIN PURIFIED PROTEIN DERIVATIVE 5 UNITS: 5 INJECTION, SOLUTION INTRADERMAL at 03:09

## 2020-09-11 RX ADMIN — POLYETHYLENE GLYCOL 3350 17 G: 17 POWDER, FOR SOLUTION ORAL at 08:09

## 2020-09-11 RX ADMIN — ASPIRIN 81 MG: 81 TABLET, COATED ORAL at 08:09

## 2020-09-11 RX ADMIN — FAMOTIDINE 20 MG: 20 TABLET ORAL at 08:09

## 2020-09-11 RX ADMIN — POTASSIUM & SODIUM PHOSPHATES POWDER PACK 280-160-250 MG 2 PACKET: 280-160-250 PACK at 08:09

## 2020-09-11 NOTE — RESPIRATORY THERAPY
09/11/20 0939   PRE-TX-O2   O2 Device (Oxygen Therapy) room air   SpO2 99 %   Pulse Oximetry Type Intermittent   $ Pulse Oximetry - Multiple Charge Pulse Oximetry - Multiple   Pulse 78   Resp 16

## 2020-09-11 NOTE — PLAN OF CARE
Problem: Physical Therapy Goal  Goal: Physical Therapy Goal  Description: Goals to be met by: 10/3/2020     Patient will increase functional independence with mobility by performin. Supine to sit with MInimal Assistance  2. Sit to stand transfer with Minimal Assistance  3. Bed to chair transfer with Minimal Assistance using Rolling Walker  4. Gait  x 200 feet with Minimal Assistance using Rolling Walker.     Outcome: Ongoing, Progressing   Ambulate with rw and CGA , WBAT RLE with KI in place.

## 2020-09-11 NOTE — PLAN OF CARE
Intervention: nutrition supplement therapy and general healthful diet      Recommendation:  1) Continue regular diet , assist with feeding if needed  2) Continue boost plus with meals   3) consider appetite stimulant- nutrition support likely not within goals of care   4) weigh pt weekly     Goals: 1) PO intakes > 50% of meals and supplements at f/u  Nutrition Goal Status: not met-continues  Communication of RD Recs: (POC, sticky note)

## 2020-09-11 NOTE — DISCHARGE SUMMARY
[facility] Hospital Medicine  Discharge Summary      Patient Name: Kevyn Terrell Jr.  MRN: [id number]  Admission Date: 8/30/2020  Hospital Length of Stay: 11 days  Discharge Date and Time:  09/11/2020 9:10 AM  Attending Physician: Virginie Faith MD   Discharging Provider: Virginie Faith MD  Primary Care Provider: Carole Rangel MD      HPI:   Kevyn Terrell Jr. is a 89 y.o. male with a PMHx of Alzheimer's disease and metastatic cancer who presented to the ED via EMS with right hip pain following a fall earlier today.  Pt's granddaughter states that she had gone to the grocery store and when she returned, she found pt on the ground.  He had apparently fallen down 2 stairs at his trailer.  Pt is pleasantly confused and does not remember the incident.  History is obtained from the granddaughter.  Pt was recently medicated for pain and has no complaints at this time.      Procedure(s) (LRB):  HEMIARTHROPLASTY, HIP, peg board, 1st assist (Right)      Hospital Course:   Patient was evaluated by Dr. Porter from Orthopedics who evaluated the patient and performed right hip hemiarthroplasty which patient tolerated well.  Postoperatively patient noted to have intermittent confusion.  Patient's family agreed for home hospice and comfort care initially but later on family requested skilled nursing facility placement.  During hospital stay patient has worked with physical therapy and occupational therapy.  Patient is requiring protein supplementation for severe malnutrition.  Patient has been accepted at [facility] and rehab skilled nursing facility for further management and care.  Post discharge patient will be following up with primary care physician and Orthopedics.  Patient to continue using aggressive incentive spirometry.    Consults:   Consults (From admission, onward)        Status Ordering Provider     Case Management/  Once     Provider:  (Not yet assigned)    Completed  JOHN BHATIA     Inpatient consult to Orthopedic Surgery  Once     Provider:  Arthur Porter MD    Completed KAILA HUSSEIN     IP consult case management/social work  Once     Provider:  (Not yet assigned)    ARTHUR Rubalcava        CXR:  No acute cardiopulmonary process.  Large hiatal or left diaphragmatic hernia without significant change.  Osseous metastatic disease.    Final Active Diagnoses:    Diagnosis Date Noted POA    PRINCIPAL PROBLEM:  Closed right hip fracture, initial encounter [S72.001A] 08/31/2020 Yes    Severe malnutrition [E43] 09/08/2020 Yes    Hypophosphatemia [E83.39] 09/01/2020 Yes    Hypokalemia [E87.6] 08/31/2020 Yes    Prostate cancer metastatic to bone [C61, C79.51] 08/31/2020 Yes    Alzheimer's dementia without behavioral disturbance [G30.9, F02.80] 08/31/2020 Yes    Hypocalcemia [E83.51] 08/31/2020 Yes      Problems Resolved During this Admission:       Discharged Condition: good    Disposition: Skilled Nursing Facility    Follow Up:  Follow-up Information     Carole Rangel MD.    Specialty: Family Medicine  Why: As needed  Contact information:  [address]  [address]  [facility]  [place] [address]  [phone]             Arthur Porter MD In 2 weeks.    Specialty: Orthopedic Surgery  Contact information:  [address]  [address]  [facility]  [place] [address]  [phone]                 Patient Instructions:      Diet Cardiac   Order Comments: Boost Plus can with meals     Diet Cardiac   Order Comments: Boost Plus can with meals     Other restrictions (specify):   Order Comments: PLEASE OBSERVE FALL PRECAUTIONS, Hip precautions     Call MD for:   Order Comments: For worsening symptoms, chest pain, shortness of breath, increased abdominal pain, high grade fever, stroke or stroke like symptoms, immediately go to the nearest Emergency Room or call 911 as soon as possible.     Other  restrictions (specify):   Order Comments: PLEASE OBSERVE FALL PRECAUTIONS.  Ambulate with rw and assistance for safety, WBAT RLE with KI in place.     Call MD for:   Order Comments: For worsening symptoms, chest pain, shortness of breath, increased abdominal pain, high grade fever, stroke or stroke like symptoms, immediately go to the nearest Emergency Room or call 911 as soon as possible.       Significant Diagnostic Studies: Labs:   CMP   Recent Labs   Lab 09/10/20  0426 09/11/20  0429    141   K 3.7 4.0    109   CO2 23 25   GLU 77 104   BUN 8 10   CREATININE 0.6 0.6   CALCIUM 7.6* 7.5*   PROT 5.1* 4.9*   ALBUMIN 1.9* 1.9*   BILITOT 0.5 0.2   ALKPHOS 347* 322*   AST 19 14   ALT 7* 7*   ANIONGAP 9 7*   ESTGFRAFRICA >60 >60   EGFRNONAA >60 >60    and CBC   Recent Labs   Lab 09/10/20  0426 09/11/20  0429   WBC 10.00 10.36   HGB 9.0* 9.1*   HCT 28.5* 30.0*    388*       Pending Diagnostic Studies:     Procedure Component Value Units Date/Time    X-Ray Chest AP Portable [[id number]]     Order Status: Sent Lab Status: No result          Medications:  Reconciled Home Medications:      Medication List      START taking these medications    aspirin 325 MG tablet  Take 1 tablet (325 mg total) by mouth 2 (two) times a day.     docusate sodium 100 MG capsule  Commonly known as: COLACE  Take 1 capsule (100 mg total) by mouth 2 (two) times daily.     HYDROcodone-acetaminophen 5-325 mg per tablet  Commonly known as: NORCO  Take 1 tablet by mouth every 6 (six) hours as needed for Pain.     polyethylene glycol 17 gram Pwpk  Commonly known as: GLYCOLAX  Take 17 g by mouth 2 (two) times daily as needed (constipation).            Indwelling Lines/Drains at time of discharge:   Lines/Drains/Airways     None                 Time spent on the discharge of patient: 33 minutes  Patient was seen and examined on the date of discharge and determined to be suitable for discharge.         Virginie Faith MD  Department of  Hospital Medicine [facility]

## 2020-09-11 NOTE — PLAN OF CARE
Tj spoke with Kimberlyn at [facility].  She can still take the patient. Waiting on TB/script.  Orders and COVID results was sent.       09/11/20 5969   Discharge Reassessment   Assessment Type Discharge Planning Reassessment   Provided patient/caregiver education on the expected discharge date and the discharge plan Yes

## 2020-09-11 NOTE — PLAN OF CARE
Patient disoriented to time and situation.  VSS, febrile at start of shift, Tylenol administered.  Tele # [id number] monitored; SR-SB.   Dressing to R hip CDI.  Denies pain.  Bandage to L forearm skin tear CDI.  ASA/SCDs for VTE prevention.  Brief in place for incontinence.  SNF placement pending covid results.  No new complaints noted at this time.  Needs addressed, safety maintained.  Monitoring

## 2020-09-11 NOTE — PLAN OF CARE
Patient cleared for discharge from case management standpoint.       09/11/20 1620   Final Note   Assessment Type Final Discharge Note   Anticipated Discharge Disposition SNF   Right Care Referral Info   Post Acute Recommendation SNF / Sub-Acute Rehab   Facility Name [facility]

## 2020-09-11 NOTE — PROGRESS NOTES
"[facility]  Adult Nutrition  Progress Note    SUMMARY         Intervention: nutrition supplement therapy and general healthful diet      Recommendation:  1) Continue regular diet , assist with feeding if needed  2) Continue boost plus with meals   3) consider appetite stimulant- nutrition support likely not within goals of care   4) weigh pt weekly     Goals: 1) PO intakes > 50% of meals and supplements at f/u  Nutrition Goal Status: not met-continues  Communication of RD Recs: (POC, sticky note)     Reason for Assessment     Reason For Assessment: follow up  Diagnosis: (closed R. hip fracture)  Relevant Medical History: Alzheimer's, metestatic bone CA  Interdisciplinary Rounds: attended     General Information Comments: 89 y/o male admitted with hip fracture s/p fall at home. Noted to be pleasantly confused and incontinent of bowel and bladder. poor-fair appetite. Boost ordered with meals. NFPE done 9/7/20, no moderate-severe wasting seen. Slow wt loss over time (71 lb since 2017), significant 22% 4/2019-9/2019 still losing.  9/11/20 Pt still eating with poor-fair appetite. No PO intakes recorded today RN did not see what pt ate and pt playing possum with blanket over his head at time of visit. Noted to be disoriented. Plan for discharge today     Nutrition Discharge Planning: SNF today- regular to promote PO intakes + boost plus TID, consider appetite stimulant     Nutrition Risk Screen     Nutrition Risk Screen: no indicators present     Nutrition/Diet History     Patient Reported Diet/Restrictions/Preferences: general  Spiritual, Cultural Beliefs, Sikh Practices, Values that Affect Care: no  Food Allergies: NKFA  Factors Affecting Nutritional Intake: decreased appetite, impaired cognitive status/motor control     Anthropometrics  Height Method: Measured(9/7/19 ED)  Height: 5' 8"  Height (inches): 68 in  Weight Method: Bed Scale  Weight: 58.3 kg (9/8/20)  Weight (lb): 128.53 lb  Ideal " Body Weight (IBW), Male: 154 lb  BMI (Calculated): 19.5  BMI Grade: 18.5-24.9 - normal(underweight for age)  Usual Body Weight (UBW), k.7 kg(10/15/17)     Lab/Procedures/Meds     Pertinent Labs Reviewed: reviewed  BMP  Lab Results   Component Value Date     2020    K 4.0 2020     2020    CO2 25 2020    BUN 10 2020    CREATININE 0.6 2020    CALCIUM 7.5 (L) 2020    ANIONGAP 7 (L) 2020    ESTGFRAFRICA >60 2020    EGFRNONAA >60 2020     Lab Results   Component Value Date    CALCIUM 7.5 (L) 2020    PHOS 1.8 (L) 2020     Lab Results   Component Value Date    ALBUMIN 1.9 (L) 2020       Pertinent Medications Reviewed: reviewed  Docusate, polyethylene glycol, KNaPhos, zofran, KCl    Estimated/Assessed Needs   admission  Weight Used For Calorie Calculations: 58.3 kg (128 lb 8.5 oz)  Energy Calorie Requirements (kcal): MSJ ( x 1.5 wt gain) = 1830 kcal  Energy Need Method: Quinn-St Nunez  Protein Requirements: 1.2-1.5 g protein/kg (age/wasting) = 70-87 g protein  Weight Used For Protein Calculations: 58.3 kg (128 lb 8.5 oz)  Fluid Requirements (mL): 1850 ml  Estimated Fluid Requirement Method: RDA Method  CHO Requirement: N/A        Nutrition Prescription Ordered     Current Diet Order: regular + boost plus TID     Evaluation of Received Nutrient/Fluid Intake     Energy Calories Required: not meeting needs  Protein Required: not meeting needs  Fluid Required: not meeting needs  Tolerance: tolerating  % Intake of Estimated Energy Needs: 50-75%  % Meal Intake: 25-50% ( boost 1-2 x daily)     Nutrition Risk     Level of Risk/Frequency of Follow-up: moderate 2 x weekly     Assessment and Plan   Severe malnutrition  Contributing Nutrition Diagnosis  Severe chronic illness related malnutrition  Related to (etiology):   Increased energy needs d/t CA, Decreased appetite and trouble with nutrition related ADLs d/t Alzheimers    Signs and  Symptoms (as evidenced by):   1) PO intakes < 75% EEN x > 1 month  2) 22% wt loss in < 1 year ( 4-9/2019) -wt loss continues  * of note moderate fat/muscle wasting as charted below    Interventions:  above    Nutrition Diagnosis Status:   continues    Monitor and Evaluation     Food and Nutrient Intake: energy intake, food and beverage intake  Food and Nutrient Adminstration: diet order  Anthropometric Measurements: weight  Biochemical Data, Medical Tests and Procedures: electrolyte and renal panel  Nutrition-Focused Physical Findings: overall appearance      Malnutrition Assessment  Malnutrition Type: chronic illness  Skin (Micronutrient): (Hemal =16, hip incision)  Teeth (Micronutrient): (missing some)   Micronutrient Evaluation: suspected deficiency  Micronutrient Evaluation Comments: phos   Energy Intake (Malnutrition): less than 75% for greater than or equal to 1 month   Orbital Region (Subcutaneous Fat Loss): moderate depletion  Upper Arm Region (Subcutaneous Fat Loss): severe depletion  Thoracic and Lumbar Region: moderate depletion   [place] Region (Muscle Loss): moderate depletion  Clavicle Bone Region (Muscle Loss): moderate depletion  Clavicle and Acromion Bone Region (Muscle Loss): moderate depletion  Scapular Bone Region (Muscle Loss): moderate depletion  Dorsal Hand (Muscle Loss): moderate depletion  Patellar Region (Muscle Loss): moderate depletion  Anterior Thigh Region (Muscle Loss): moderate depletion  Posterior Calf Region (Muscle Loss): moderate depletion   Edema (Fluid Accumulation): 0-->no edema present   Subcutaneous Fat Loss (Final Summary): moderate protein-calorie malnutrition  Muscle Loss Evaluation (Final Summary): moderate protein-calorie malnutrition       Nutrition Follow-Up     RD Follow-up?: Yes

## 2020-09-11 NOTE — PT/OT/SLP PROGRESS
Physical Therapy Treatment    Patient Name:  Kevyn Terrell Jr.   MRN:  [id number]    Recommendations:     Discharge Recommendations:  nursing facility, skilled   Discharge Equipment Recommendations: other (see comments)(TBD at the next level of care)   Barriers to discharge: None    Assessment:     Kevyn Terrell Jr. is a 89 y.o. male admitted with a medical diagnosis of Closed right hip fracture, initial encounter.  He presents with the following impairments/functional limitations:  weakness, impaired endurance, impaired self care skills, impaired functional mobilty, gait instability, decreased lower extremity function, decreased safety awareness, orthopedic precautions . Tolerated treatment. Ambulated in hallway with rw and CGA, WBAT RLE with KI in place. Returned to room to sit up in chair for breakfast , then returned to bed.    Rehab Prognosis: Fair; patient would benefit from acute skilled PT services to address these deficits and reach maximum level of function.    Recent Surgery: Procedure(s) (LRB):  HEMIARTHROPLASTY, HIP, peg board, 1st assist (Right) 9 Days Post-Op    Plan:     During this hospitalization, patient to be seen BID to address the identified rehab impairments via gait training, therapeutic activities, therapeutic exercises and progress toward the following goals:    · Plan of Care Expires:  10/03/20    Subjective     Chief Complaint: none stated  Patient/Family Comments/goals: none stated  Pain/Comfort:  · Pain Rating 1: 0/10      Objective:     Communicated with nurse Corina Jensen prior to session.  Patient found supine with bed alarm, knee immobilizer, SCD, telemetry upon PT entry to room.     General Precautions: Standard, fall   Orthopedic Precautions:RLE weight bearing as tolerated, RLE posterior precautions   Braces: Knee immobilizer     Functional Mobility:  · Bed Mobility:     · Rolling Left:  contact guard assistance  · Rolling Right: contact guard assistance  · Supine to Sit:  minimum assistance  · Sit to Supine: minimum assistance  · Transfers:     · Sit to Stand:  minimum assistance with rolling walker  · Gait: 250' with rw and CGA, WBAT RLE with KI in place.       AM-PAC 6 CLICK MOBILITY          Therapeutic Activities and Exercises:   Transferred EOB with A for RLE.   Ambulated in hallway with rw and CGA,   Sat up in chair briefly for few bites of breakfast.   SPT chair to bed with rw and CGA. Sit to supine with Min A.     Patient left supine with all lines intact, call button in reach, bed alarm on and nurse Corina notified..    GOALS:   Multidisciplinary Problems     Physical Therapy Goals        Problem: Physical Therapy Goal    Goal Priority Disciplines Outcome Goal Variances Interventions   Physical Therapy Goal     PT, PT/OT Ongoing, Progressing     Description: Goals to be met by: 10/3/2020     Patient will increase functional independence with mobility by performin. Supine to sit with MInimal Assistance  2. Sit to stand transfer with Minimal Assistance  3. Bed to chair transfer with Minimal Assistance using Rolling Walker  4. Gait  x 200 feet with Minimal Assistance using Rolling Walker.                      Time Tracking:     PT Received On: 20  PT Start Time: 825     PT Stop Time: 855  PT Total Time (min): 30 min     Billable Minutes: Gait Training 15min and Therapeutic Activity 15min    Treatment Type: Treatment  PT/PTA: PTA     PTA Visit Number: 5     Gale Wyatt PTA  2020

## 2020-09-11 NOTE — PLAN OF CARE
Per MDRs, patient to received chest xray and labs.       09/11/20 1227   Post-Acute Status   Post-Acute Authorization Placement   Post-Acute Placement Status Awaiting Internal Medical Clearance

## 2020-09-11 NOTE — NURSING
IV removed.  Transport picked up pt.  Belongings and discharge papers and prescription sent with patient.

## 2020-09-11 NOTE — ASSESSMENT & PLAN NOTE
Contributing Nutrition Diagnosis  Severe chronic illness related malnutrition  Related to (etiology):   Increased energy needs d/t CA, Decreased appetite and trouble with nutrition related ADLs d/t Alzheimers    Signs and Symptoms (as evidenced by):   1) PO intakes < 75% EEN x > 1 month  2) 22% wt loss in < 1 year ( 4-9/2019) -wt loss continues  * of note moderate fat/muscle wasting as charted below    Interventions:  above    Nutrition Diagnosis Status:   continues

## 2020-09-11 NOTE — PLAN OF CARE
Patient accepted by [facility].  Patient's nurse can call report to [phone].  [facility] for patient.       09/11/20 3094   Post-Acute Status   Post-Acute Authorization Placement   Post-Acute Placement Status Set-up Complete

## 2020-09-11 NOTE — PT/OT/SLP PROGRESS
Physical Therapy      Patient Name:  Kevyn Terrell Jr.   MRN:  [id number]    Patient not seen today secondary to Other (Comment)(Nurse attempting to get patient to urinate.). Will follow-up 9/12/20.    Gale Wyatt PTA

## 2020-09-11 NOTE — NURSING
Unable to obtain stat UA via straight cath. Condom cath applied and waiting on patient to urinate.

## 2020-09-14 ENCOUNTER — PATIENT OUTREACH (OUTPATIENT)
Dept: ADMINISTRATIVE | Facility: CLINIC | Age: 85
End: 2020-09-14

## 2020-09-15 ENCOUNTER — TELEPHONE (OUTPATIENT)
Dept: MEDSURG UNIT | Facility: HOSPITAL | Age: 85
End: 2020-09-15

## 2020-09-16 ENCOUNTER — TELEPHONE (OUTPATIENT)
Dept: FAMILY MEDICINE | Facility: CLINIC | Age: 85
End: 2020-09-16

## 2020-09-16 DIAGNOSIS — E43 SEVERE MALNUTRITION: ICD-10-CM

## 2020-09-16 DIAGNOSIS — G30.1 LATE ONSET ALZHEIMER'S DISEASE WITHOUT BEHAVIORAL DISTURBANCE: ICD-10-CM

## 2020-09-16 DIAGNOSIS — Z96.641 HISTORY OF RIGHT HIP HEMIARTHROPLASTY: ICD-10-CM

## 2020-09-16 DIAGNOSIS — C61 PROSTATE CANCER METASTATIC TO BONE: Primary | ICD-10-CM

## 2020-09-16 DIAGNOSIS — C79.51 PROSTATE CANCER METASTATIC TO BONE: Primary | ICD-10-CM

## 2020-09-16 DIAGNOSIS — F02.80 LATE ONSET ALZHEIMER'S DISEASE WITHOUT BEHAVIORAL DISTURBANCE: ICD-10-CM

## 2020-09-16 NOTE — TELEPHONE ENCOUNTER
----- Message from Robert Kyle sent at 9/16/2020 12:00 PM CDT -----  Regarding: Needs referal  Contact: Kevyn Terrell  Sasha pt's grand daughter is calling, the patient needs a reveille for [facility] in [place], [place] Baez's# [phone]

## 2020-09-16 NOTE — TELEPHONE ENCOUNTER
Spoke with Cherrie pacheco/ [facility] and she will fax over original hospice order for pt. DESMOND

## 2020-09-16 NOTE — TELEPHONE ENCOUNTER
I need you to call and find out who put pt on hospice initially and why. I don't believe it was myself as I checked eCW. I have not seen the pt in some time, since 6/2019, so I could not attest to him needing hospice at this point, without having a copy of this documentation.

## 2020-09-16 NOTE — TELEPHONE ENCOUNTER
Spoke with pt grand daughter - pt grand daughter states that pt was on hospice but fell and broke his hip about 2 weeks ago and was taken off of hospice to have surgery and now pt needs referral to go back to hospice. DESMOND

## 2020-09-21 PROBLEM — Z96.641 HISTORY OF RIGHT HIP HEMIARTHROPLASTY: Status: ACTIVE | Noted: 2020-09-21

## 2020-09-21 NOTE — TELEPHONE ENCOUNTER
----- Message from Sasha Nguyễn sent at 9/21/2020 11:57 AM CDT -----  Pt's daughter calling to fu on a request put inlast week for her father to be put back on hospice    # [phone]

## 2020-09-21 NOTE — TELEPHONE ENCOUNTER
Spoke with Nichelle pt daughter - Pt daughter informed of the status of her request for pt hospice care. Pt daughter informed that as soon as Dr. Rangel has been able to contact someone with [facility] and has more information. Pt daughter verbalized an understanding. DESMOND

## 2020-09-21 NOTE — TELEPHONE ENCOUNTER
Apparently I was not the doctor that has been following him on hospice. That was Dr. Chong in [place]. I don't have any recorded documentation of why he is on hospice. I am trying to get in touch with [facility] to assist in the issue but they are having telephone issues at the moment and I can only get in touch with the answering service. Will let her know when I have more info.    ##Send back to me after notifying granddaughter

## 2020-09-22 NOTE — TELEPHONE ENCOUNTER
Spoke with pt daughter - Pt daughter notified that signed order has been faxed to [facility]/ DESMOND

## 2020-09-23 ENCOUNTER — PATIENT OUTREACH (OUTPATIENT)
Dept: ADMINISTRATIVE | Facility: CLINIC | Age: 85
End: 2020-09-23

## 2020-11-06 ENCOUNTER — TELEPHONE (OUTPATIENT)
Dept: FAMILY MEDICINE | Facility: CLINIC | Age: 85
End: 2020-11-06

## 2020-11-06 NOTE — TELEPHONE ENCOUNTER
----- Message from Sasha Nguyễn sent at 11/6/2020  9:14 AM CST -----  [facility] calling to report the pt passed away 11/5/30 at his home   Cb [phone]
